# Patient Record
Sex: FEMALE | Race: WHITE | Employment: OTHER | URBAN - METROPOLITAN AREA
[De-identification: names, ages, dates, MRNs, and addresses within clinical notes are randomized per-mention and may not be internally consistent; named-entity substitution may affect disease eponyms.]

---

## 2020-01-08 ENCOUNTER — HOSPITAL ENCOUNTER (OUTPATIENT)
Dept: PHYSICAL THERAPY | Age: 70
Discharge: HOME | End: 2020-01-08
Payer: MEDICARE

## 2020-01-08 PROCEDURE — 97110 THERAPEUTIC EXERCISES: CPT | Performed by: PHYSICAL THERAPIST

## 2020-01-08 PROCEDURE — 97162 PT EVAL MOD COMPLEX 30 MIN: CPT | Performed by: PHYSICAL THERAPIST

## 2020-01-08 NOTE — PROGRESS NOTES
PT DAILY TREATMENT NOTE/LUMBAR EVAL 10-18    Patient Name: Maegan Cash  Date:2020  : 1950  [x]  Patient  Verified  Payor: [place] MEDICARE / Plan: [place] MEDICARE PART A & B / Product Type: Medicare /    In time:8:40  Out time:9:30  Total Treatment Time (min): 50  Visit #: 1 of 12    Medicare/BCBS Only   Total Timed Codes (min):  30 1:1 Treatment Time:  50     Treatment Area: Lumbar spine pain [M54.5]  Left hip pain [M25.552]  SUBJECTIVE  Pain Level (0-10 scale): 2/10 now; 2/10 at best; 8/10 at worst.  []constant [x]intermittent [x]improving []worsening []no change since onset    Any medication changes, allergies to medications, adverse drug reactions, diagnosis change, or new procedure performed?: [x] No    [] Yes (see summary sheet for update)  Subjective functional status/changes:     PLOF: Independent. Does garden work. Limitations to PLOF: Avoiding stairs, twisting, turning and lifting. Doing steps one at a time. Mechanism of Injury: Insidious onset lower back pain. Denies any injury. Prior intermittent back pain that was self limited. Current symptoms/Complaints: Pain across her lower back and into the left hip. She also sites pain and \"arthritis\" in her shoulders and knees. Sx increase with twisting, sitting, riding in a car too long. Previous Treatment/Compliance: None  PMHx/Surgical Hx: Left OCTAVIO  Work Hx: Retired, part time work as a CNA. Living Situation: Lives in a two story home. Pt Goals: \"I would like to be more limber and more strength and able to do more\"  Barriers: [x]pain []financial []time []transportation []other  Cognition: A & O x 3       OBJECTIVE/EXAMINATION  Domestic Life: Lives with her brother and sister-in-law. Activity/Recreational Limitations: Limited on stairs. Mobility: Ambulates with increased right lateral lean. FWB. No AD. Self Care: Independent.       20 min [x]Eval                  []Re-Eval       30 min Therapeutic Exercise:  [] See flow sheet : Emphasis on core stabilization and trunk and LE strength, trunk AROM. Rationale: increase ROM and increase strength to improve the patients ability to increase her functional activity level. With   [] TE   [] TA   [] neuro   [] other: Patient Education: [x] Review HEP    [] Progressed/Changed HEP based on:   [] positioning   [] body mechanics   [] transfers   [] heat/ice application    [] other:          Physical Therapy Evaluation - Lumbar Spine ([facility])    Symptoms:  Aggravated by:   [x] Bending [] Sitting [x] Standing [x] Walking   [] Moving [x] Cough [x] Sneeze [] Valsalva   [] AM  [] PM  Lying:  [] sup   [x] pro   [] sidelying   [] Other:     Eased by:    [] Bending [] Sitting [] Standing [] Walking   [] Moving [] AM  [] PM  Lying: [] sup  [] pro  [x] sidelying       Past History/Treatments:     Diagnostic Tests: [] Lab work [x] X-rays    [] CT [] MRI     [] Other:  Results:    OBJECTIVE  Posture:  Lateral Shift: [x] R    [] L     [] +  [] -  Kyphosis: [] Increased [] Decreased   []  WNL  Lordosis:  [] Increased [] Decreased   [] WNL  Pelvic symmetry: [] WNL    [x] Other:  Left IC is higher in standing. Gait:  [] Normal     [x] Abnormal:    Active Movements: [] N/A   [] Too acute   [] Other:  ROM % AROM % PROM Comments:pain, area   Forward flexion 40-60 75%     Extension 20-30 0%  Pain   SB right 20-30 25%     SB left 20-30 0%  Pain L/S and left lateral hip   Rotation right 5-10 25%  Pain right lower back   Rotation left 5-10 25%  Pain lower back     Side Glide:  Sustained passive positioning test:    Dural Mobility:  SLR Sitting: [x] R    [x] L    [] +    [x] -  @ (degrees):           Supine: [x] R    [] L    [x] +    [] -  @ (70 degrees):   Slump Test: [x] R    [x] L    [] +    [x] -  @ (degrees):   Prone Knee Bend: [] R    [] L    [] +    [] -     Palpation  [x] Min  [] Mod  [] Severe    Location: Bilateral hips, lateral aspect.   [] Min  [x] Mod  [] Severe    Location: Right ASIS  [x] Min [] Mod  [] Severe    Location: Right QL    Strength   L(0-5) R (0-5) N/T   Hip Flexion (L1,2)   []   Knee Extension (L3,4)   []   Ankle Dorsiflexion (L4)   []   Great Toe Extension (L5)   []   Ankle Plantarflexion (S1)   []   Knee Flexion (S1,2) 4 4 []   Upper Abdominals   [x]   Lower Abdominals   [x]   Paraspinals   [x]   Back Rotators   [x]   Hip Extension 3 3 []   Hip Abduction 3- 2 []     Special Tests  Lumbar:  Iliolumbar ligament test [x] Pos  [] Neg  Right/Left                  S[place]roilliac:  Compression: [] +    [] -     Gapping:  [] +    [x] -     Leg Length: [x] +    [] -   Position: Supine. Crests: in supine right IC is higher    Mobility: Standing flex: (-)    Supine to sit: Left leg goes from long with normal with supine to sit. Hip: Luellen Dessert:  [x] R    [] L    [x] +   Right lateral hip pain    Piriformis: [x] R    [] L    [x] +             Global Muscular Weakness:  Abdominals:  2/5       Pain Level (0-10 scale) post treatment: 5/10    ASSESSMENT/Changes in Function: Patient with signs and symptoms consistent with lower back pain and hip pain. Patient is (+) for pelvic obliquity and leg length discrepancy. She has limited AROM L/S with core and proximal weakness noted bilaterally. She has tenderness to palpation in the right > left L/S. Patient will continue to benefit from skilled PT services to modify and progress therapeutic interventions, address functional mobility deficits, address ROM deficits, address strength deficits, analyze and address soft tissue restrictions, analyze and cue movement patterns, analyze and modify body mechanics/ergonomics and assess and modify postural abnormalities to attain remaining goals. [x]  See Plan of Care  []  See progress note/recertification  []  See Discharge Summary         Progress towards goals / Updated goals:  Short Term Goals: To be accomplished in 1 weeks:  1.   Patient will become proficient in their HEP and will be compliant in performing that program.  Evaluation:   Patient given a written/illustrated HEP.     Long Term Goals: To be accomplished in 4 weeks:  1. Patient's pain level will be 2-3/10 with activity in order to improve patient's ability to perform normal ADLs. Evaluation:  2/10-8/10  2. Patient will demonstrate AROM Lumbar Spine WFL to increase ease of ADLs. Evaluation:  AROM L/S flex limited 25%; ext limited 100%; Right SB limited 75%; Left SB limited 100%; Right and Left Rotation limited 75%. 3. Patient will increase FOTO score to 48 to indicate increased functional mobility. Evaluation:  38  4. Patient will tolerate reciprocal stair climbing in order to improve her mobility at home. Evaluation:  Doing steps one at a time.     PLAN  [x]  Upgrade activities as tolerated     [x]  Continue plan of care  []  Update interventions per flow sheet       []  Discharge due to:_  []  Other:_      Alondra Carnes, PT 1/8/2020  8:46 AM

## 2020-01-08 NOTE — PROGRESS NOTES
[facility]              [address]        [place], [address] [place]   [phone] fax    Plan of Care/ Statement of Necessity for Physical Therapy Services    Patient name: Crescencio Gerard Start of Care: 2020   Referral source: Ifeoma Brown MD : 1950    Medical Diagnosis: Lumbar spine pain [M54.5]  Left hip pain [M25.552]  Payor: VA MEDICARE / Plan: VA MEDICARE PART A & B / Product Type: Medicare /  Onset Date:2019    Treatment Diagnosis: Low back pain; Left hip pain   Prior Hospitalization: see medical history Provider#: [id number]   Medications: Verified on Patient summary List    Comorbidities: Anxiety/Panic disorder, Arthritis, Back Pain, BMI 33.5, Depression, Incontinence, Left OCTAVIO, Sleep dysfunction. Prior Level of Function: Independent. Did gardening. She noted that she has had difficulty getting up and down off the floor. The Plan of Care and following information is based on the information from the initial evaluation. Assessment/ key information: Patient with signs and symptoms consistent with lower back pain and hip pain. Patient is (+) for pelvic obliquity and leg length discrepancy. She has limited AROM L/S with core and proximal weakness noted bilaterally. She has tenderness to palpation in the right > left L/S. Patient will benefit from a program of skilled physical therapy to include therapeutic exercises to address strength deficits, therapeutic activities to improve functional mobility, neuromuscular reeducation to address balance, coordination and proprioception, manual therapy to address ROM and tissue extensibility and modalities as indicated. All questions were answered.     Evaluation Complexity History HIGH Complexity :3+ comorbidities / personal factors will impact the outcome/ POC ; Examination MEDIUM Complexity : 3 Standardized tests and measures addressing body structure, function, activity limitation and / or participation in recreation  ;Presentation MEDIUM Complexity : Evolving with changing characteristics  ; Clinical Decision Making MEDIUM Complexity : FOTO score of 26-74  Overall Complexity Rating: MEDIUM  Problem List: pain affecting function, decrease ROM, decrease strength, impaired gait/ balance, decrease ADL/ functional abilitiies, decrease activity tolerance and decrease flexibility/ joint mobility   Treatment Plan may include any combination of the following: Therapeutic exercise, Therapeutic activities, Neuromuscular re-education, Physical agent/modality and Manual therapy  Patient / Family readiness to learn indicated by: asking questions, trying to perform skills and interest  Persons(s) to be included in education: patient (P)  Barriers to Learning/Limitations: None  Patient Goal (s): \"I would like to be more limber and more strength and able to do more\"  Patient Self Reported Health Status: good  Rehabilitation Potential: good    Short Term Goals: To be accomplished in 1 weeks:  1. Patient will become proficient in their HEP and will be compliant in performing that program.  Evaluation:   Patient given a written/illustrated HEP. Long Term Goals: To be accomplished in 4 weeks:  1. Patient's pain level will be 2-3/10 with activity in order to improve patient's ability to perform normal ADLs. Evaluation:  2/10-8/10  2. Patient will demonstrate AROM Lumbar Spine WFL to increase ease of ADLs. Evaluation:  AROM L/S flex limited 25%; ext limited 100%; Right SB limited 75%; Left SB limited 100%; Right and Left Rotation limited 75%. 3. Patient will increase FOTO score to 48 to indicate increased functional mobility. Evaluation:  38  4. Patient will tolerate reciprocal stair climbing in order to improve her mobility at home. Evaluation:  Doing steps one at a time. Frequency / Duration: Patient to be seen 2-3 times per week for 4 weeks.     Patient/ Caregiver education and instruction: Diagnosis, prognosis, exercises   [x]  Plan of care has been reviewed with PTA      Certification Period: 1/8/2020-2/6/2020  Edith Mccarthy, PT 1/8/2020 8:44 AM  ________________________________________________________________________    I certify that the above Therapy Services are being furnished while the patient is under my care. I agree with the treatment plan and certify that this therapy is necessary.     Physician's Signature:____________Date:_________TIME:________    ** Signature, Date and Time must be completed for valid certification **  Please sign and return to [facility]/ [address]              [address] [place], [address] [place]   [phone] fax

## 2020-01-10 ENCOUNTER — HOSPITAL ENCOUNTER (OUTPATIENT)
Dept: PHYSICAL THERAPY | Age: 70
Discharge: HOME | End: 2020-01-10
Payer: MEDICARE

## 2020-01-10 PROCEDURE — 97110 THERAPEUTIC EXERCISES: CPT

## 2020-01-10 PROCEDURE — 97112 NEUROMUSCULAR REEDUCATION: CPT

## 2020-01-10 NOTE — PROGRESS NOTES
PT DAILY TREATMENT NOTE 10-18    Patient Name: Debo Jarvis  Date:1/10/2020  : 1950  [x]  Patient  Verified  Payor: VA MEDICARE / Plan: VA MEDICARE PART A & B / Product Type: Medicare /    In time:9:32  Out time:10:04  Total Treatment Time (min): 32  Visit #: 2 of 12    Medicare/BCBS Only   Total Timed Codes (min):  32 1:1 Treatment Time:  32       Treatment Area: Lumbar spine pain [M54.5]  Left hip pain [M25.552]    SUBJECTIVE  Pain Level (0-10 scale): 0  Any medication changes, allergies to medications, adverse drug reactions, diagnosis change, or new procedure performed?: [x] No    [] Yes (see summary sheet for update)  Subjective functional status/changes:   [] No changes reported  Pt reports that movement causes an increase in back pain.      OBJECTIVE        Min Type Additional Details    [] Estim:  []Unatt       []IFC  []Premod                        []Other:  []w/ice   []w/heat  Position:  Location:    [] Estim: []Att    []TENS instruct  []NMES                    []Other:  []w/US   []w/ice   []w/heat  Position:  Location:    []  Traction: [] Cervical       []Lumbar                       [] Prone          []Supine                       []Intermittent   []Continuous Lbs:  [] before manual  [] after manual    []  Ultrasound: []Continuous   [] Pulsed                           []1MHz   []3MHz W/cm2:  Location:    []  Iontophoresis with dexamethasone         Location: [] Take home patch   [] In clinic    []  Ice     []  heat  []  Ice massage  []  Laser   []  Anodyne Position:  Location:    []  Laser with stim  []  Other:  Position:  Location:    []  Vasopneumatic Device Pressure:       [] lo [] med [] hi   Temperature: [] lo [] med [] hi   [] Skin assessment post-treatment:  []intact []redness- no adverse reaction    []redness  adverse reaction:         17 min Therapeutic Exercise:  [x] See flow sheet :   Rationale: increase ROM and increase strength to improve the patients ability to increase standing tolerance. 15 min Neuromuscular Re-education:  [x]  See flow sheet :core activation exercises. Rationale: increase ROM and increase strength  to improve the patients ability to increase tolerance to activities. With   [] TE   [] TA   [] neuro   [] other: Patient Education: [x] Review HEP    [] Progressed/Changed HEP based on:   [] positioning   [] body mechanics   [] transfers   [] heat/ice application    [] other:      Other Objective/Functional Measures: Pt reports performing HEP. Pain Level (0-10 scale) post treatment: 2    ASSESSMENT/Changes in Function: Initiated exercises per POC. Pt has limited motion and mobility with supine lumbar trunk rotation. Patient will continue to benefit from skilled PT services to modify and progress therapeutic interventions, address functional mobility deficits, address ROM deficits, address strength deficits and analyze and address soft tissue restrictions to attain remaining goals. []  See Plan of Care  []  See progress note/recertification  []  See Discharge Summary         Progress towards goals / Updated goals:  Short Term Goals: To be accomplished in 1 weeks:  1.  Patient will become proficient in their HEP and will be compliant in performing that program.  Evaluation:   Patient given a written/illustrated HEP. Current: Goal met: Pt reports performing HEP. 1/10/20     Long Term Goals: To be accomplished in 4 weeks:  1. Patient's pain level will be 2-3/10 with activity in order to improve patient's ability to perform normal ADLs. Evaluation:  2/10-8/10  2. Patient will demonstrate AROM Lumbar Spine WFL to increase ease of ADLs. Evaluation:  AROM L/S flex limited 25%; ext limited 100%; Right SB limited 75%; Left SB limited 100%; Right and Left Rotation limited 75%. 3. Patient will increase FOTO score to 48 to indicate increased functional mobility. Evaluation:  38  4.  Patient will tolerate reciprocal stair climbing in order to improve her mobility at home.   Evaluation:  Doing steps one at a time.       PLAN  []  Upgrade activities as tolerated     [x]  Continue plan of care  []  Update interventions per flow sheet       []  Discharge due to:_  []  Other:_      Trudy Kayser, TAMELA 1/10/2020  9:35 AM    Future Appointments   Date Time Provider Troy Shantal   1/14/2020 10:30 AM Kenneth Sanford, PT [facility]   1/17/2020 10:30 AM Betancourt Simpers, PTA [facility]   1/21/2020  8:30 AM Kenneth Sanford, PT [facility]   1/24/2020 10:00 AM Betancourt Simpers, PTA [facility]   1/28/2020  8:00 AM Kenneth Sanford, PT [facility]   1/31/2020 10:30 AM Betancourt Simpers, PTA [facility]

## 2020-01-14 ENCOUNTER — HOSPITAL ENCOUNTER (OUTPATIENT)
Dept: PHYSICAL THERAPY | Age: 70
Discharge: HOME | End: 2020-01-14
Payer: MEDICARE

## 2020-01-14 PROCEDURE — 97110 THERAPEUTIC EXERCISES: CPT | Performed by: PHYSICAL THERAPIST

## 2020-01-14 PROCEDURE — 97112 NEUROMUSCULAR REEDUCATION: CPT | Performed by: PHYSICAL THERAPIST

## 2020-01-14 NOTE — PROGRESS NOTES
PT DAILY TREATMENT NOTE 10-18    Patient Name: Maryfrances Oppenheim  Date:2020  : 1950  [x]  Patient  Verified  Payor: VA MEDICARE / Plan: [place] MEDICARE PART A & B / Product Type: Medicare /    In time:10:30  Out time:11:05  Total Treatment Time (min): 35  Visit #: 3 of 12    Medicare/BCBS Only   Total Timed Codes (min):  35 1:1 Treatment Time:  35       Treatment Area: Lumbar spine pain [M54.5]  Left hip pain [M25.552]    SUBJECTIVE  Pain Level (0-10 scale): 0/10  Any medication changes, allergies to medications, adverse drug reactions, diagnosis change, or new procedure performed?: [x] No    [] Yes (see summary sheet for update)  Subjective functional status/changes:   [] No changes reported  Patient reports she is having less pain and is feeling better with onset of skilled therapy. OBJECTIVE    20 min Therapeutic Exercise:  [] See flow sheet : Emphasis on trunk AROM and LE strengthening   Rationale: increase ROM and increase strength to improve the patients ability to increase standing tolerance. 15 min Neuromuscular Re-education:  []  See flow sheet : Emphasis on core strengthening. Rationale: increase strength and improve coordination  to improve the patients ability to increase patients ADLs. With   [] TE   [] TA   [] neuro   [] other: Patient Education: [x] Review HEP    [] Progressed/Changed HEP based on:   [] positioning   [] body mechanics   [] transfers   [] heat/ice application    [] other:      Other Objective/Functional Measures: Patient with decreased ability to SB left. Pain Level (0-10 scale) post treatment: 4    ASSESSMENT/Changes in Function: Patient with continued right sided lower back pain and hip pain.     Patient will continue to benefit from skilled PT services to modify and progress therapeutic interventions, address functional mobility deficits, address ROM deficits, address strength deficits and analyze and address soft tissue restrictions to attain remaining goals. [x]? See Plan of Care  []? See progress note/recertification  []? See Discharge Summary         Progress towards goals / Updated goals:  Short Term Goals: To be accomplished in 1 weeks:  1.  Patient will become proficient in their HEP and will be compliant in performing that program.  Evaluation:   Patient given a written/illustrated HEP. Current: Goal met: Pt reports performing HEP. 1/10/20     Long Term Goals: To be accomplished in 4 weeks:  1. Patient's pain level will be 2-3/10 with activity in order to improve patient's ability to perform normal ADLs. Evaluation:  2/10-8/10  Current:  0/10-5/10.  1/14/2020. Progressing. 2. Patient will demonstrate AROM Lumbar Spine WFL to increase ease of ADLs. Evaluation:  AROM L/S flex limited 25%; ext limited 100%; Right SB limited 75%; Left SB limited 100%; Right and Left Rotation limited 75%. 3. Patient will increase FOTO score to 48 to indicate increased functional mobility. Evaluation:  38  4. Patient will tolerate reciprocal stair climbing in order to improve her mobility at home. Evaluation:  Doing steps one at a time.     PLAN  [x]  Upgrade activities as tolerated     [x]  Continue plan of care  []  Update interventions per flow sheet       []  Discharge due to:_  []  Other:_      Charmel Barthel, PT 1/14/2020  10:33 AM    Future Appointments   Date Time Provider Troy Murguia   1/17/2020 10:30 AM Jorge Lr PTA [facility]   1/21/2020  8:30 AM Cherelle Cleveland PT [facility]   1/24/2020 10:00 AM Jorge Lr PTA [facility]   1/28/2020  8:00 AM Cherelle Cleveland PT [facility]   1/31/2020 10:30 AM Jorge Lr PTA [facility]

## 2020-01-17 ENCOUNTER — HOSPITAL ENCOUNTER (OUTPATIENT)
Dept: PHYSICAL THERAPY | Age: 70
Discharge: HOME | End: 2020-01-17
Payer: MEDICARE

## 2020-01-17 PROCEDURE — 97110 THERAPEUTIC EXERCISES: CPT

## 2020-01-17 PROCEDURE — 97112 NEUROMUSCULAR REEDUCATION: CPT

## 2020-01-17 NOTE — PROGRESS NOTES
PT DAILY TREATMENT NOTE 10-18    Patient Name: Aldair Andrade  Date:2020  : 1950  [x]  Patient  Verified  Payor: [place] MEDICARE / Plan: [place] MEDICARE PART A & B / Product Type: Medicare /    In time:10:34  Out time:11:34  Total Treatment Time (min): 60  Visit #: 4 of 12    Medicare/BCBS Only   Total Timed Codes (min):  50 1:1 Treatment Time:  40       Treatment Area: Lumbar spine pain [M54.5]  Left hip pain [M25.552]    SUBJECTIVE  Pain Level (0-10 scale): 3  Any medication changes, allergies to medications, adverse drug reactions, diagnosis change, or new procedure performed?: [x] No    [] Yes (see summary sheet for update)  Subjective functional status/changes:   [] No changes reported  Pt reports she was sore and had a little pain after last session. OBJECTIVE    Modality rationale: decrease pain to improve the patients ability to decrease difficulty while performing tasks.     Min Type Additional Details    [] Estim:  []Unatt       []IFC  []Premod                        []Other:  []w/ice   []w/heat  Position:  Location:    [] Estim: []Att    []TENS instruct  []NMES                    []Other:  []w/US   []w/ice   []w/heat  Position:  Location:    []  Traction: [] Cervical       []Lumbar                       [] Prone          []Supine                       []Intermittent   []Continuous Lbs:  [] before manual  [] after manual    []  Ultrasound: []Continuous   [] Pulsed                           []1MHz   []3MHz W/cm2:  Location:    []  Iontophoresis with dexamethasone         Location: [] Take home patch   [] In clinic   10 []  Ice     [x]  heat  []  Ice massage  []  Laser   []  Anodyne Position:sitting  Location:back     []  Laser with stim  []  Other:  Position:  Location:    []  Vasopneumatic Device Pressure:       [] lo [] med [] hi   Temperature: [] lo [] med [] hi   [] Skin assessment post-treatment:  []intact []redness- no adverse reaction    []redness  adverse reaction:           35 min Therapeutic Exercise:  [x]? See flow sheet :   Rationale: increase ROM and increase strength to improve the patients ability to increase standing tolerance.      15 min Neuromuscular Re-education:  [x]? See flow sheet :core activation exercises. Rationale: increase ROM and increase strength  to improve the patients ability to increase tolerance to activities. With   [] TE   [] TA   [] neuro   [] other: Patient Education: [x] Review HEP    [] Progressed/Changed HEP based on:   [] positioning   [] body mechanics   [] transfers   [] heat/ice application    [] other:      Other Objective/Functional Measures: fair form to perform PPT. Pain Level (0-10 scale) post treatment:2    ASSESSMENT/Changes in Function: Continue to progress pt as tolerated. NO initiations of new exercises due to being sore after last visit. Patient will continue to benefit from skilled PT services to modify and progress therapeutic interventions, address functional mobility deficits, address ROM deficits, address strength deficits, analyze and address soft tissue restrictions and address imbalance/dizziness to attain remaining goals. []  See Plan of Care  []  See progress note/recertification  []  See Discharge Summary         Progress towards goals / Updated goals:  hort Term Goals: To be accomplished in 1 weeks:  1.  Patient will become proficient in their HEP and will be compliant in performing that program.  Evaluation:   Patient given a written/illustrated HEP. Current: Goal met: Pt reports performing HEP. 1/10/20     Long Term Goals: To be accomplished in 4 weeks:  1. Patient's pain level will be 2-3/10 with activity in order to improve patient's ability to perform normal ADLs. Evaluation:  2/10-8/10  Current:  0/10-5/10.  1/14/2020. Progressing. 2. Patient will demonstrate AROM Lumbar Spine WFL to increase ease of ADLs. Evaluation:  AROM L/S flex limited 25%; ext limited 100%; Right SB limited 75%;  Left SB limited 100%; Right and Left Rotation limited 75%. 3. Patient will increase FOTO score to 48 to indicate increased functional mobility. Evaluation:  38  4. Patient will tolerate reciprocal stair climbing in order to improve her mobility at home. Evaluation:  Doing steps one at a time.     PLAN  []  Upgrade activities as tolerated     [x]  Continue plan of care  []  Update interventions per flow sheet       []  Discharge due to:_  []  Other:_      Jennyfer Fuller PTA 1/17/2020  10:40 AM    Future Appointments   Date Time Provider Troy Murguia   1/21/2020  8:30 AM Andrés Hernandez, PT [facility] [address]   1/24/2020 10:00 AM Patrice Elias PTA [facility] [address]   1/28/2020  8:00 AM Andrés Hernandez PT [facility] [address]   1/31/2020 10:30 AM Patrice Elias PTA [facility] [address]

## 2020-01-21 ENCOUNTER — HOSPITAL ENCOUNTER (OUTPATIENT)
Dept: PHYSICAL THERAPY | Age: 70
Discharge: HOME | End: 2020-01-21
Payer: MEDICARE

## 2020-01-21 PROCEDURE — 97112 NEUROMUSCULAR REEDUCATION: CPT | Performed by: PHYSICAL THERAPIST

## 2020-01-21 PROCEDURE — 97110 THERAPEUTIC EXERCISES: CPT | Performed by: PHYSICAL THERAPIST

## 2020-01-21 NOTE — PROGRESS NOTES
PT DAILY TREATMENT NOTE 10-18    Patient Name: Debo Jarvis  Date:2020  : 1950  [x]  Patient  Verified  Payor: [place] MEDICARE / Plan: [place] MEDICARE PART A & B / Product Type: Medicare /    In time:8:28  Out time:9:20  Total Treatment Time (min): 52  Visit #: 5 of 12    Medicare/BCBS Only   Total Timed Codes (min):  52 1:1 Treatment Time:  32       Treatment Area: Lumbar spine pain [M54.5]  Left hip pain [M25.552]    SUBJECTIVE  Pain Level (0-10 scale): 0  Any medication changes, allergies to medications, adverse drug reactions, diagnosis change, or new procedure performed?: [x] No    [] Yes (see summary sheet for update)  Subjective functional status/changes:   [] No changes reported  Patient denies pain in her hip and back but notes some right shoulder pain. OBJECTIVE    35 min Therapeutic Exercise:  [] See flow sheet : Emphasis is on trunk and hip ROM and strength. Rationale: increase ROM and increase strength to improve the patients ability to increase patients ADLs. 17 min Neuromuscular Re-education:  []  See flow sheet : Emphasis on core activation for lumbar stabilization. Rationale: increase strength and improve coordination  to improve the patients ability to increase tolerance to activities. With   [] TE   [] TA   [] neuro   [] other: Patient Education: [x] Review HEP    [] Progressed/Changed HEP based on:   [] positioning   [] body mechanics   [] transfers   [] heat/ice application    [] other:      Other Objective/Functional Measures: Patient performed stair climbing in therapy today with 1 HHA to replicate her home environment. She was able to perform reciprocal stair climbing 5 steps x 5. Pain Level (0-10 scale) post treatment: 0    ASSESSMENT/Changes in Function: Patient with decreased pain, she still has intermittent episodes of back and hip pain. She is able to perform steps reciprocally in therapy.     Patient will continue to benefit from skilled PT services to modify and progress therapeutic interventions, address functional mobility deficits, address ROM deficits, address strength deficits, analyze and address soft tissue restrictions and address imbalance/dizziness to attain remaining goals. [x]  See Plan of Care  []  See progress note/recertification  []  See Discharge Summary         Progress towards goals / Updated goals:  Short Term Goals: To be accomplished in 1 weeks:  1.  Patient will become proficient in their HEP and will be compliant in performing that program.  Evaluation:   Patient given a written/illustrated HEP. Current: Goal met: Pt reports performing HEP. 1/10/20     Long Term Goals: To be accomplished in 4 weeks:  1. Patient's pain level will be 2-3/10 with activity in order to improve patient's ability to perform normal ADLs. Evaluation:  2/10-8/10  Current:  0/10-5/10.  1/21/2020.  Progressing. 2. Patient will demonstrate AROM Lumbar Spine WFL to increase ease of ADLs. Evaluation:  AROM L/S flex limited 25%; ext limited 100%; Right SB limited 75%; Left SB limited 100%; Right and Left Rotation limited 75%. 3. Patient will increase FOTO score to 48 to indicate increased functional mobility. Evaluation:  38  4. Patient will tolerate reciprocal stair climbing in order to improve her mobility at home. Evaluation:  Doing steps one at a time. Current:  Still doing steps one at a time. 1/21/20. No change.     PLAN  [x]  Upgrade activities as tolerated     [x]  Continue plan of care  []  Update interventions per flow sheet       []  Discharge due to:_  []  Other:_      Marcelo Talavera, PT 1/21/2020  8:32 AM    Future Appointments   Date Time Provider Troy Murguia   1/24/2020 10:00 AM Meryle Furry, PTA [facility]   1/28/2020  8:00 AM David Coleman PT [facility]   1/31/2020 10:30 AM Meryle Furry, PTA [facility]

## 2020-01-24 ENCOUNTER — HOSPITAL ENCOUNTER (OUTPATIENT)
Dept: PHYSICAL THERAPY | Age: 70
Discharge: HOME | End: 2020-01-24
Payer: MEDICARE

## 2020-01-24 PROCEDURE — 97112 NEUROMUSCULAR REEDUCATION: CPT

## 2020-01-24 PROCEDURE — 97110 THERAPEUTIC EXERCISES: CPT

## 2020-01-24 NOTE — PROGRESS NOTES
PT DAILY TREATMENT NOTE 10-18    Patient Name: Klever Cervantes  Date:2020  : 1950  [x]  Patient  Verified  Payor: VA MEDICARE / Plan: VA MEDICARE PART A & B / Product Type: Medicare /    In time:10:03  Out time:11:05  Total Treatment Time (min): 62  Visit #: 6 of 12    Medicare/BCBS Only   Total Timed Codes (min):  52 1:1 Treatment Time:  40       Treatment Area: Lumbar spine pain [M54.5]  Left hip pain [M25.552]    SUBJECTIVE  Pain Level (0-10 scale): 0  Any medication changes, allergies to medications, adverse drug reactions, diagnosis change, or new procedure performed?: [x] No    [] Yes (see summary sheet for update)  Subjective functional status/changes:   [] No changes reported  Pt reports that she most has pain that comes on at the end of the day. OBJECTIVE    Modality rationale: decrease pain to improve the patients ability to decrease difficulty while performing tasks.     Min Type Additional Details    [] Estim:  []Unatt       []IFC  []Premod                        []Other:  []w/ice   []w/heat  Position:  Location:    [] Estim: []Att    []TENS instruct  []NMES                    []Other:  []w/US   []w/ice   []w/heat  Position:  Location:    []  Traction: [] Cervical       []Lumbar                       [] Prone          []Supine                       []Intermittent   []Continuous Lbs:  [] before manual  [] after manual    []  Ultrasound: []Continuous   [] Pulsed                           []1MHz   []3MHz W/cm2:  Location:    []  Iontophoresis with dexamethasone         Location: [] Take home patch   [] In clinic   10 []  Ice     [x]  heat  []  Ice massage  []  Laser   []  Anodyne Position:sitting  Location:back     []  Laser with stim  []  Other:  Position:  Location:    []  Vasopneumatic Device Pressure:       [] lo [] med [] hi   Temperature: [] lo [] med [] hi   [] Skin assessment post-treatment:  []intact []redness- no adverse reaction    []redness  adverse reaction:       27 min Therapeutic Exercise:  [x]? ? See flow sheet :   Rationale: increase ROM and increase strength to improve the patients ability to increase standing tolerance.      25 min Neuromuscular Re-education:  [x]? ?  See flow sheet :core activation exercises.    Rationale: increase ROM and increase strength  to improve the patients ability to increase tolerance to activities.               With   [] TE   [] TA   [] neuro   [] other: Patient Education: [x] Review HEP    [] Progressed/Changed HEP based on:   [] positioning   [] body mechanics   [] transfers   [] heat/ice application    [] other:      Other Objective/Functional Measures: Pt has poor activation with performing exercises. Pain Level (0-10 scale) post treatment: 0    ASSESSMENT/Changes in Function: Continue to work on increase in standing tolerance and tolerance to activities. Patient will continue to benefit from skilled PT services to modify and progress therapeutic interventions, address functional mobility deficits, address ROM deficits, address strength deficits and analyze and address soft tissue restrictions to attain remaining goals. []  See Plan of Care  []  See progress note/recertification  []  See Discharge Summary         Progress towards goals / Updated goals:  Short Term Goals: To be accomplished in 1 weeks:  1.  Patient will become proficient in their HEP and will be compliant in performing that program.  Evaluation:   Patient given a written/illustrated HEP. Current: Goal met: Pt reports performing HEP. 1/10/20     Long Term Goals: To be accomplished in 4 weeks:  1. Patient's pain level will be 2-3/10 with activity in order to improve patient's ability to perform normal ADLs. Evaluation:  2/10-8/10  Current:  0/10-5/10.  1/21/2020.  Progressing. 2. Patient will demonstrate AROM Lumbar Spine WFL to increase ease of ADLs. Evaluation:  AROM L/S flex limited 25%; ext limited 100%; Right SB limited 75%;  Left SB limited 100%; Right and Left Rotation limited 75%. 3. Patient will increase FOTO score to 48 to indicate increased functional mobility. Evaluation:  38  4. Patient will tolerate reciprocal stair climbing in order to improve her mobility at home. Evaluation:  Doing steps one at a time. Current:  Still doing steps one at a time. 1/21/20.   No change.       PLAN  []  Upgrade activities as tolerated     [x]  Continue plan of care  []  Update interventions per flow sheet       []  Discharge due to:_  []  Other:_      Shanda Brittle, PTA 1/24/2020  10:08 AM    Future Appointments   Date Time Provider Troy Murguia   1/28/2020  8:00 AM Elvis Chinchilla, PT [facility]   1/31/2020 10:30 AM Janis Zamora PTA [facility]

## 2020-01-28 ENCOUNTER — HOSPITAL ENCOUNTER (OUTPATIENT)
Dept: PHYSICAL THERAPY | Age: 70
Discharge: HOME | End: 2020-01-28
Payer: MEDICARE

## 2020-01-28 PROCEDURE — 97110 THERAPEUTIC EXERCISES: CPT | Performed by: PHYSICAL THERAPIST

## 2020-01-28 PROCEDURE — 97112 NEUROMUSCULAR REEDUCATION: CPT | Performed by: PHYSICAL THERAPIST

## 2020-01-28 NOTE — PROGRESS NOTES
PT DAILY TREATMENT NOTE 10-18    Patient Name: Destinee Vang  Date:2020  : 1950  [x]  Patient  Verified  Payor: VA MEDICARE / Plan: VA MEDICARE PART A & B / Product Type: Medicare /    In time:7:58  Out time:8:57  Total Treatment Time (min): 61  Visit #: 7 of 12    Medicare/BCBS Only   Total Timed Codes (min):  59 1:1 Treatment Time:  55       Treatment Area: Lumbar spine pain [M54.5]  Left hip pain [M25.552]    SUBJECTIVE  Pain Level (0-10 scale): 1  Any medication changes, allergies to medications, adverse drug reactions, diagnosis change, or new procedure performed?: [x] No    [] Yes (see summary sheet for update)  Subjective functional status/changes:   [] No changes reported  Patient reports more pain in her left hip than her right hip or back at this time. Also notes some right knee pain. OBJECTIVE    29 min Therapeutic Exercise:  [] See flow sheet : Emphasis on trunk AROM and LE strength. Rationale: increase ROM and increase strength to improve the patients ability to increase her standing tolerance. 25 min Neuromuscular Re-education:  []  See flow sheet :Emphasis on core activation to improve lumbar strength and stability. Rationale: increase strength and improve coordination  to improve the patients ability to increase patients ADLs. With   [] TE   [] TA   [] neuro   [] other: Patient Education: [x] Review HEP    [] Progressed/Changed HEP based on:   [] positioning   [] body mechanics   [] transfers   [] heat/ice application    [] other:      Other Objective/Functional Measures: AROM L/S flex limited 25%; ext limited 75%; Right SB and Left SB equal bilaterally;  Right and Left Rotation limited 75%. Pain with extension, Right SB. Pain Level (0-10 scale) post treatment: 1    ASSESSMENT/Changes in Function:  Patient with decreased pain. She has improved function as noted on her FOTO and increased AROM.     Patient will continue to benefit from skilled PT services to modify and progress therapeutic interventions, address functional mobility deficits, address ROM deficits, address strength deficits and analyze and address soft tissue restrictions to attain remaining goals. []? See Plan of Care  [x]? See progress note/recertification  []? See Discharge Summary         Progress towards goals / Updated goals:  Short Term Goals: To be accomplished in 1 weeks:  1.  Patient will become proficient in their HEP and will be compliant in performing that program.  Evaluation:   Patient given a written/illustrated HEP. Current: Goal met: Pt reports performing HEP. 1/10/20     Long Term Goals: To be accomplished in 4 weeks:  1. Patient's pain level will be 2-3/10 with activity in order to improve patient's ability to perform normal ADLs. Evaluation:  2/10-8/10  Current:  0/10-3/10.  1/28/2020. Goal Met.  2. Patient will demonstrate AROM Lumbar Spine WFL to increase ease of ADLs. Evaluation:  AROM L/S flex limited 25%; ext limited 100%; Right SB limited 75%; Left SB limited 100%; Right and Left Rotation limited 75%. Current:  AROM L/S flex limited 25%; ext limited 75%; Right SB and Left SB equal bilaterally;  Right and Left Rotation limited 75%. Pain with extension, Right SB.  1/28/2020.  3. Patient will increase FOTO score to 48 to indicate increased functional mobility. Evaluation:  38   Current:  53.  1/28/2020.    4. Patient will tolerate reciprocal stair climbing in order to improve her mobility at home. Evaluation:  Doing steps one at a time. Current:  States she does reciprocal stairs intermittently depending on fatigue and if she is carrying anything. 1/28/2020. Progressing.     PLAN  [x]  Upgrade activities as tolerated     [x]  Continue plan of care  []  Update interventions per flow sheet       []  Discharge due to:_  []  Other:_      Quinn Lewis, PT 1/28/2020  8:01 AM    Future Appointments   Date Time Provider Troy Murguia   1/31/2020 10:30 AM Marc Bird, [place] [facility]

## 2020-01-28 NOTE — PROGRESS NOTES
[facility]              [address], [address]   [phone] fax    Continued Plan of Care/ Re-certification for Physical Therapy Services    Patient name: Maryfrances Oppenheim Start of Care: 2019   Referral source: Jennifer Arora MD : 1950   Medical/Treatment Diagnosis: Lumbar spine pain [M54.5]  Left hip pain [M25.552]  Payor: VA MEDICARE / Plan: VA MEDICARE PART A & B / Product Type: Medicare /  Onset Date:10/2019     Prior Hospitalization: see medical history Provider#: [id number]   Medications: Verified on Patient Summary List    Comorbidities: Anxiety/Panic disorder, Arthritis, Back Pain, BMI 33.5, Depression, Incontinence, Left OCTAVIO, Sleep dysfunction. Prior Level of Function: Independent. Did gardening. She noted that she has had difficulty getting up and down off the floor. Visits from Start of Care: 7    Missed Visits: 0    The Plan of Care and following information is based on the patient's current status:  Goal: Patient's pain level will be 2-3/10 with activity in order to improve patient's ability to perform normal ADLs. Status at last note/certification: -  Current Status: 0/10-3/10. met    Goal: Patient will demonstrate AROM Lumbar Spine WFL to increase ease of ADLs. Status at last note/certification: AROM L/S flex limited 25%; ext limited 100%; Right SB limited 75%; Left SB limited 100%; Right and Left Rotation limited 75%. Current Status: AROM L/S flex limited 25%; ext limited 75%; Right SB and Left SB equal bilaterally;  Right and Left Rotation limited 75%. Pain with extension, Right SB. Progressing, not met    Goal: Patient will increase FOTO score to 48 to indicate increased functional mobility. Status at last note/certification:  70   Current Status: 53. met    Goal: Patient will tolerate reciprocal stair climbing in order to improve her mobility at home.   Status at last note/certification: Doing steps one at a time. Current Status:States she does reciprocal stairs intermittently depending on fatigue and if she is carrying anything. Partially met    Key functional changes: Patient has improved her ability to go up and down steps and can do 5 steps reciprocally up and down in therapy x 5 repetitions. She has decreased c/o pain and increased AROM. Problems/ barriers to goal attainment: None     Problem List: decrease ROM, decrease strength, impaired gait/ balance and decrease ADL/ functional abilitiies    Treatment Plan: Therapeutic exercise, Therapeutic activities, Neuromuscular re-education, Physical agent/modality, Gait/balance training and Manual therapy     Patient Goal (s) has been updated and includes:   Short Term Goals: To be accomplished in 1 weeks:  1.  Patient will become proficient in their HEP and will be compliant in performing that program.  Evaluation:   Patient given a written/illustrated HEP. Current: Goal met: Pt reports performing HEP. 1/10/20     Long Term Goals: To be accomplished in 4 weeks:  1. Patient's pain level will be 2-3/10 with activity in order to improve patient's ability to perform normal ADLs. Evaluation:  2/10-8/10  Current:  0/10-3/10.  1/28/2020. Goal Met.  2. Patient will demonstrate AROM Lumbar Spine WFL to increase ease of ADLs. Evaluation:  AROM L/S flex limited 25%; ext limited 100%; Right SB limited 75%; Left SB limited 100%; Right and Left Rotation limited 75%. Current:  AROM L/S flex limited 25%; ext limited 75%; Right SB and Left SB equal bilaterally;  Right and Left Rotation limited 75%. Pain with extension, Right SB.  1/28/2020.  3. Patient will increase FOTO score to 48 to indicate increased functional mobility. Evaluation:  38   Current:  53.  1/28/2020.    4. Patient will tolerate reciprocal stair climbing in order to improve her mobility at home. Evaluation:  Doing steps one at a time.   Current:  States she does reciprocal stairs intermittently depending on fatigue and if she is carrying anything. 1/28/2020. Progressing. Goals for this certification period to be accomplished in 4 weeks:   1. Patient will demonstrate AROM Lumbar Spine WFL to increase ease of ADLs. PN:  AROM L/S flex limited 25%; ext limited 75%; Right SB and Left SB equal bilaterally;  Right and Left Rotation limited 75%. Pain with extension, Right SB.  1/28/2020.  2. Patient will tolerate reciprocal stair climbing in order to improve her mobility at home. PN:  States she does reciprocal stairs intermittently depending on fatigue and if she is carrying anything. 1/28/2020. Progressing. 3.  Patient will improve her dynamic balance to a rating of good in order to safely ambulate in her community. PN:  Fair dynamic balance. Frequency / Duration: Patient to be seen 2 times per week for 4 weeks:    Assessment / Recommendations:Patient has made improvements in ROM and decreased pain. She does have a concern regarding her balance and would recommend we continue with skilled therapy to address balance to reduce any fall risk. Certification Period: 2/3/2020 - 3/2/2020    Marii Merritt, PT 1/28/2020 12:08 PM    ________________________________________________________________________  I certify that the above Therapy Services are being furnished while the patient is under my care. I agree with the treatment plan and certify that this therapy is necessary. [] I have read the above and request that my patient continue as recommended.   [] I have read the above report and request that my patient continue therapy with the following changes/special instructions: _______________________________________  [] I have read the above report and request that my patient be discharged from therapy    Physician's Signature:____________Date:_________TIME:________    ** Signature, Date and Time must be completed for valid certification **  Please sign and return to [facility]              [address], [address]  [phone] fax

## 2020-01-31 ENCOUNTER — HOSPITAL ENCOUNTER (OUTPATIENT)
Dept: PHYSICAL THERAPY | Age: 70
Discharge: HOME | End: 2020-01-31
Payer: MEDICARE

## 2020-01-31 PROCEDURE — 97112 NEUROMUSCULAR REEDUCATION: CPT

## 2020-01-31 PROCEDURE — 97110 THERAPEUTIC EXERCISES: CPT

## 2020-01-31 NOTE — PROGRESS NOTES
PT DAILY TREATMENT NOTE 10-18    Patient Name: Debora Loyola  Date:2020  : 1950  [x]  Patient  Verified  Payor: [place] MEDICARE / Plan: VA MEDICARE PART A & B / Product Type: Medicare /    In time:10:32  Out time:11:22  Total Treatment Time (min): 50  Visit #: 1 of 8 (new recert)    Medicare/BCBS Only   Total Timed Codes (min):  50 1:1 Treatment Time:  40       Treatment Area: Lumbar spine pain [M54.5]  Left hip pain [M25.552]    SUBJECTIVE  Pain Level (0-10 scale): 0  Any medication changes, allergies to medications, adverse drug reactions, diagnosis change, or new procedure performed?: [x] No    [] Yes (see summary sheet for update)  Subjective functional status/changes:   [] No changes reported  Pt reports that her back and right hip have been feeling better, but now her left hip has been giving her problems. OBJECTIVE        Min Type Additional Details    [] Estim:  []Unatt       []IFC  []Premod                        []Other:  []w/ice   []w/heat  Position:  Location:    [] Estim: []Att    []TENS instruct  []NMES                    []Other:  []w/US   []w/ice   []w/heat  Position:  Location:    []  Traction: [] Cervical       []Lumbar                       [] Prone          []Supine                       []Intermittent   []Continuous Lbs:  [] before manual  [] after manual    []  Ultrasound: []Continuous   [] Pulsed                           []1MHz   []3MHz W/cm2:  Location:    []  Iontophoresis with dexamethasone         Location: [] Take home patch   [] In clinic    []  Ice     []  heat  []  Ice massage  []  Laser   []  Anodyne Position:  Location:    []  Laser with stim  []  Other:  Position:  Location:    []  Vasopneumatic Device Pressure:       [] lo [] med [] hi   Temperature: [] lo [] med [] hi   [] Skin assessment post-treatment:  []intact []redness- no adverse reaction    []redness  adverse reaction:           25 min Therapeutic Exercise:  [x]? ?? See flow sheet : Rationale: increase ROM and increase strength to improve the patients ability to increase standing tolerance.      25 min Neuromuscular Re-education:  [x]? ??  See flow sheet :core activation exercises.    Rationale: increase ROM and increase strength  to improve the patients ability to increase tolerance to activities.            With   [] TE   [] TA   [] neuro   [] other: Patient Education: [x] Review HEP    [] Progressed/Changed HEP based on:   [] positioning   [] body mechanics   [] transfers   [] heat/ice application    [] other:      Other Objective/Functional Measures: continues to be limited with SLS. Pain Level (0-10 scale) post treatment: 3    ASSESSMENT/Changes in Function: Pt wants to be put on hold until she follows up with her doctor on 2/4/20 to see if it would be beneficial to continue therapy. Patient will continue to benefit from skilled PT services to modify and progress therapeutic interventions, address functional mobility deficits, address ROM deficits, address strength deficits and analyze and address soft tissue restrictions to attain remaining goals. []  See Plan of Care  []  See progress note/recertification  []  See Discharge Summary         Progress towards goals / Updated goals:  Short Term Goals: To be accomplished in 1 weeks:  1.  Patient will become proficient in their HEP and will be compliant in performing that program.  PN: : Goal met: Pt reports performing HEP. 1/10/20     Long Term Goals: To be accomplished in 4 weeks:  1. Patient's pain level will be 2-3/10 with activity in order to improve patient's ability to perform normal ADLs. PN: Goal Met. 0/10-3/10.  1/28/2020.   2. Patient will demonstrate AROM Lumbar Spine WFL to increase ease of ADLs. PN:  AROM L/S flex limited 25%; ext limited 75%; Right SB and Left SB equal bilaterally;  Right and Left Rotation limited 75%.   Pain with extension, Right SB.  1/28/2020.  3. Patient will increase FOTO score to 48 to indicate increased functional mobility. PN:  53.  1/28/2020.    4. Patient will tolerate reciprocal stair climbing in order to improve her mobility at home. PN:  States she does reciprocal stairs intermittently depending on fatigue and if she is carrying anything. 1/28/2020. Progressing.       PLAN  []  Upgrade activities as tolerated     []  Continue plan of care  []  Update interventions per flow sheet       []  Discharge due to:_  [x]  Other:_ Pt wants to wait to schedule future appointments until going to the doctor on 2/4/20     Antonino Cedillo PTA 1/31/2020  10:36 AM    No future appointments.

## 2020-03-17 NOTE — PROGRESS NOTES
[facility]              [address]        [place], [address] [place]   [phone] fax    Discharge Summary  Patient name: Lew Shaw Start of Care: 2020   Referral source: Marco Antonio Serrano MD : 1950   Medical/Treatment Diagnosis: Lumbar spine pain [M54.5]  Left hip pain [M25.552]  Payor: VA MEDICARE / Plan: VA MEDICARE PART A & B / Product Type: Medicare /  Onset Date:10/2019     Prior Hospitalization: see medical history Provider#: [id number]   Medications: Verified on Patient Summary List    Comorbidities: Anxiety/Panic disorder, Arthritis, Back Pain, BMI 33.5, Depression, Incontinence, Left OCTAVIO, Sleep dysfunction. Prior Level of Function: Independent.  Did gardening.  She noted that she has had difficulty getting up and down off the floor. Visits from Start of Care: 8    Missed Visits: 0  Reporting Period : 20 to 20    Summary of Care:  1. Patient's pain level will be 2-3/10 with activity in order to improve patient's ability to perform normal ADLs. PN:  0/10-3/10. Goal Met.     2. Patient will demonstrate AROM Lumbar Spine WFL to increase ease of ADLs. PN:  AROM L/S flex limited 25%; ext limited 75%; Right SB and Left SB equal bilaterally;  Right and Left Rotation limited 75%.  Pain with extension, Right SB.    Progressing, not met. 3. Patient will increase FOTO score to 48 to indicate increased functional mobility. PN:  53.  Goal Met     4. Patient will tolerate reciprocal stair climbing in order to improve her mobility at home. PN:  States she does reciprocal stairs intermittently depending on fatigue and if she is carrying anything.   Progressing. ASSESSMENT/RECOMMENDATIONS:  Patient reports that she was to follow up with her doctor on 20 and was holding therapy until that reevaluation occurred.     [x]Discontinue therapy: [x]Patient has reached or is progressing toward set goals      []Patient is non-compliant or has abdicated      []Due to lack of appreciable progress towards set goals    Onesimo Peralta, PT 3/17/2020 2:34 PM

## 2020-07-22 RX ORDER — AZITHROMYCIN 500 MG/1
TABLET, FILM COATED ORAL DAILY
Status: ON HOLD | COMMUNITY
End: 2020-10-19

## 2020-07-22 RX ORDER — AMLODIPINE BESYLATE 5 MG/1
5 TABLET ORAL DAILY
COMMUNITY

## 2020-07-22 RX ORDER — MONTELUKAST SODIUM 10 MG/1
10 TABLET ORAL DAILY
COMMUNITY

## 2020-07-22 RX ORDER — AMITRIPTYLINE HYDROCHLORIDE 10 MG/1
TABLET, FILM COATED ORAL
COMMUNITY

## 2020-07-22 RX ORDER — MELOXICAM 15 MG/1
15 TABLET ORAL DAILY
COMMUNITY

## 2020-07-22 RX ORDER — CYCLOBENZAPRINE HCL 5 MG
5 TABLET ORAL
Status: ON HOLD | COMMUNITY
End: 2020-10-19

## 2020-07-22 RX ORDER — FLUOXETINE HYDROCHLORIDE 20 MG/1
CAPSULE ORAL DAILY
COMMUNITY

## 2020-07-22 RX ORDER — IPRATROPIUM BROMIDE 42 UG/1
2 SPRAY, METERED NASAL 4 TIMES DAILY
COMMUNITY

## 2020-07-22 RX ORDER — PANTOPRAZOLE SODIUM 40 MG/1
40 TABLET, DELAYED RELEASE ORAL DAILY
COMMUNITY

## 2020-07-22 RX ORDER — LORAZEPAM 0.5 MG/1
TABLET ORAL
COMMUNITY

## 2020-07-22 RX ORDER — ZOSTER VACCINE RECOMBINANT, ADJUVANTED 50 MCG/0.5
0.5 KIT INTRAMUSCULAR ONCE
Status: ON HOLD | COMMUNITY
End: 2020-10-19

## 2020-07-22 RX ORDER — ALBUTEROL SULFATE 90 UG/1
AEROSOL, METERED RESPIRATORY (INHALATION)
COMMUNITY

## 2020-07-22 RX ORDER — SIMVASTATIN 20 MG/1
TABLET, FILM COATED ORAL
Status: ON HOLD | COMMUNITY
End: 2020-10-19

## 2020-07-31 ENCOUNTER — OFFICE VISIT (OUTPATIENT)
Dept: ORTHOPEDIC SURGERY | Age: 70
End: 2020-07-31
Payer: MEDICARE

## 2020-07-31 DIAGNOSIS — M17.12 PRIMARY OSTEOARTHRITIS OF LEFT KNEE: ICD-10-CM

## 2020-07-31 DIAGNOSIS — M17.11 PRIMARY OSTEOARTHRITIS OF RIGHT KNEE: ICD-10-CM

## 2020-07-31 PROCEDURE — 99203 OFFICE O/P NEW LOW 30 MIN: CPT | Performed by: ORTHOPAEDIC SURGERY

## 2020-07-31 NOTE — PROGRESS NOTES
Providers protocol for the intake nurse to complete in patient's chart:    Rafael Britton presents today for right leg pain from hip to foot    Reason for visit: pain in right leg  Pain assessment:6   Height: 5 foot 4 inches  Weight: 200lb    Tempeture is taken by NANO MOSQUEDA  Travel Screening done by NANO MOSQUEDA    Provider will complete the rest of patient's chart

## 2020-07-31 NOTE — PROGRESS NOTES
Name: Stephy Pan    : 1950  Service Dept: [address] [place] [address] MEDICINE         Chief Complaint   Patient presents with    Leg Pain     right    Knee Pain     right    Hip Pain     right    Foot Pain     right        Patient's Pharmacies:    [facility]/pharmacy [address] [place], [place] [id number]  Phone: [phone] Fax: [phone]       There were no vitals taken for this visit. Allergies   Allergen Reactions    Latex, Natural Rubber Unknown (comments)    Codeine Unknown (comments)        Current Outpatient Medications   Medication Sig Dispense Refill    amitriptyline (ELAVIL) 10 mg tablet Take  by mouth nightly.  amLODIPine (NORVASC) 5 mg tablet Take 5 mg by mouth daily.  FLUoxetine (PROzac) 20 mg capsule Take  by mouth daily.  montelukast (SINGULAIR) 10 mg tablet Take 10 mg by mouth daily.  azithromycin (ZITHROMAX) 500 mg tab Take  by mouth daily.  cyclobenzaprine (FLEXERIL) 5 mg tablet Take 5 mg by mouth.  ipratropium (ATROVENT) 42 mcg (0.06 %) nasal spray 2 Sprays four (4) times daily.  LORazepam (ATIVAN) 0.5 mg tablet Take  by mouth.  measles, mumps & rubella Vacc, PF, (M-M-R II) 1,000-12,500 TCID50/0.5 mL solr injection 0.5 mL by SubCUTAneous route.  meloxicam (MOBIC) 15 mg tablet Take 15 mg by mouth daily.  pantoprazole (PROTONIX) 40 mg tablet Take 40 mg by mouth daily.  varicella-zoster recombinant, PF, (Shingrix, PF,) 50 mcg/0.5 mL susr injection 0.5 mL by IntraMUSCular route once.  simvastatin (ZOCOR) 20 mg tablet Take  by mouth nightly.  albuterol (Ventolin HFA) 90 mcg/actuation inhaler Take  by inhalation. There is no problem list on file for this patient. History reviewed. No pertinent family history.      Social History     Socioeconomic History    Marital status: UNKNOWN     Spouse name: Not on file    Number of children: Not on file    Years of education: Not on file    Highest education level: Not on file   Tobacco Use    Smoking status: Former Smoker    Smokeless tobacco: Never Used   Substance and Sexual Activity    Alcohol use: Yes     Comment: daily        Past Surgical History:   Procedure Laterality Date    FOOT/TOES SURGERY PROC UNLISTED      HX HIP REPLACEMENT          Past Medical History:   Diagnosis Date    Acid reflux     Arthritis     High cholesterol     Hypertension         HPI:   I have reviewed and agree with PFSH and ROS and intake form in chart and the record. Review of Systems:   Patient is a pleasant appearing individual, appropriately dressed, well hydrated, well nourished, who is alert, appropriately oriented for age, and in no acute distress with a normal gait and normal affect who does not appear to be in any significant pain. Physical Exam:  Right Knee -Decrease range of motion with flexion, Knee arc of greater than 50 degrees, Some crepitation, Grossly neurovascularly intact, Good cap refill, No skin lesion, Moderate swelling, No gross instability, Some quadriceps weakness, Kellgren and Jack at least grade 3    Left knee - Neurovascularly intact with good cap refill, full range of motion and full strength, well healed incision noted, no swelling, no erythema, no instability. HPI:  The patient is here with a chief complaint of right knee pain since January progressively getting worse. Pain is 6/10. ROS:  Positive for instability and fatigue. X-rays done at [facility] are positive for bone-on-bone arthritis. Assessment/Plan:  1. Right knee severe OA failed conservative treatment. Plan will be for a cortisone injection to the right knee. If it helps, it is all we need to do. If it does not, we will consider treatment options. We will see the patient back on an as-needed basis. She is scheduled for a right total knee replacement.   We will plan for a right total knee replacement, general medical clearance, outpatient basis and go from there. Return to Office:    Follow-up Information    None

## 2020-07-31 NOTE — PATIENT INSTRUCTIONS
Knee Pain or Injury: Care Instructions  Your Care Instructions     Injuries are a common cause of knee problems. Sudden (acute) injuries may be caused by a direct blow to the knee. They can also be caused by abnormal twisting, bending, or falling on the knee. Pain, bruising, or swelling may be severe, and may start within minutes of the injury. Overuse is another cause of knee pain. Other causes are climbing stairs, kneeling, and other activities that use the knee. Everyday wear and tear, especially as you get older, also can cause knee pain. Rest, along with home treatment, often relieves pain and allows your knee to heal. If you have a serious knee injury, you may need tests and treatment. Follow-up care is a key part of your treatment and safety. Be sure to make and go to all appointments, and call your doctor if you are having problems. It's also a good idea to know your test results and keep a list of the medicines you take. How can you care for yourself at home? · Be safe with medicines. Read and follow all instructions on the label. ? If the doctor gave you a prescription medicine for pain, take it as prescribed. ? If you are not taking a prescription pain medicine, ask your doctor if you can take an over-the-counter medicine. · Rest and protect your knee. Take a break from any activity that may cause pain. · Put ice or a cold pack on your knee for 10 to 20 minutes at a time. Put a thin cloth between the ice and your skin. · Prop up a sore knee on a pillow when you ice it or anytime you sit or lie down for the next 3 days. Try to keep it above the level of your heart. This will help reduce swelling. · If your knee is not swollen, you can put moist heat, a heating pad, or a warm cloth on your knee. · If your doctor recommends an elastic bandage, sleeve, or other type of support for your knee, wear it as directed.   · Follow your doctor's instructions about how much weight you can put on your leg. Use a cane, crutches, or a walker as instructed. · Follow your doctor's instructions about activity during your healing process. If you can do mild exercise, slowly increase your activity. · Reach and stay at a healthy weight. Extra weight can strain the joints, especially the knees and hips, and make the pain worse. Losing even a few pounds may help. When should you call for help? [id number] anytime you think you may need emergency care. For example, call if:  · You have symptoms of a blood clot in your lung (called a pulmonary embolism). These may include:  ? Sudden chest pain. ? Trouble breathing. ? Coughing up blood. Call your doctor now or seek immediate medical care if:  · You have severe or increasing pain. · Your leg or foot turns cold or changes color. · You cannot stand or put weight on your knee. · Your knee looks twisted or bent out of shape. · You cannot move your knee. · You have signs of infection, such as:  ? Increased pain, swelling, warmth, or redness. ? Red streaks leading from the knee. ? Pus draining from a place on your knee. ? A fever. · You have signs of a blood clot in your leg (called a deep vein thrombosis), such as:  ? Pain in your calf, back of the knee, thigh, or groin. ? Redness and swelling in your leg or groin. Watch closely for changes in your health, and be sure to contact your doctor if:  · You have tingling, weakness, or numbness in your knee. · You have any new symptoms, such as swelling. · You have bruises from a knee injury that last longer than 2 weeks. · You do not get better as expected. Where can you learn more? Go to http://teresa-lelo.info/  Enter K195 in the search box to learn more about \"Knee Pain or Injury: Care Instructions. \"  Current as of: June 26, 2019               Content Version: 12.5  © 6153-4358 [employer]wise, [employer].    Care instructions adapted under license by [employer] (which disclaims liability or warranty for this information). If you have questions about a medical condition or this instruction, always ask your healthcare professional. [place] [address] any warranty or liability for your use of this information.

## 2020-09-10 DIAGNOSIS — M17.11 PRIMARY OSTEOARTHRITIS OF RIGHT KNEE: ICD-10-CM

## 2020-10-12 ENCOUNTER — OFFICE VISIT (OUTPATIENT)
Dept: ORTHOPEDIC SURGERY | Age: 70
End: 2020-10-12

## 2020-10-12 ENCOUNTER — HOSPITAL ENCOUNTER (OUTPATIENT)
Dept: PREADMISSION TESTING | Age: 70
Discharge: HOME | End: 2020-10-12
Payer: MEDICARE

## 2020-10-12 DIAGNOSIS — M17.11 PRIMARY OSTEOARTHRITIS OF RIGHT KNEE: Primary | ICD-10-CM

## 2020-10-12 DIAGNOSIS — M17.11 OSTEOARTHRITIS OF RIGHT KNEE, UNSPECIFIED OSTEOARTHRITIS TYPE: ICD-10-CM

## 2020-10-12 LAB — SARS-COV-2, COV2: NORMAL

## 2020-10-12 PROCEDURE — 87635 SARS-COV-2 COVID-19 AMP PRB: CPT

## 2020-10-12 RX ORDER — TRAMADOL HYDROCHLORIDE 50 MG/1
50 TABLET ORAL
Qty: 30 TAB | Refills: 0 | Status: ON HOLD | OUTPATIENT
Start: 2020-10-12 | End: 2020-10-19

## 2020-10-12 RX ORDER — CEPHALEXIN 500 MG/1
500 CAPSULE ORAL EVERY 8 HOURS
Qty: 3 CAP | Refills: 0 | Status: SHIPPED | OUTPATIENT
Start: 2020-10-12 | End: 2020-10-13

## 2020-10-12 NOTE — H&P (VIEW-ONLY)
Name: Kenia Upton : 1950 Service Dept: [address] [place] [address] and Sports Medicine Patient's Pharmacies: [facility]/pharmacy #[id number] Sammy Gustafson [address] [place] [address] Phone: [phone] Fax: [phone] Chief Complaint Patient presents with  Pre-op Exam  
  
 
There were no vitals taken for this visit. Allergies Allergen Reactions  Latex Other (comments) Redness of skin and blisters  Latex, Natural Rubber Unknown (comments)  Other Food Rash Raw vegetables, rash if touched  Adhesive Tape-Silicones Rash  Aloe Vera Rash  Chlorhexidine Other (comments) Redness and Burning sensation  Codeine Nausea and Vomiting  Codeine Unknown (comments)  Other Plant, Animal, Environmental Other (comments) Detergents make skin blister Current Outpatient Medications Medication Sig Dispense Refill  cephALEXin (KEFLEX) 500 mg capsule Take 1 Cap by mouth every eight (8) hours for 3 doses. 3 Cap 0  
 traMADoL (ULTRAM) 50 mg tablet Take 1 Tab by mouth every six (6) hours as needed for Pain for up to 14 days. Max Daily Amount: 200 mg. 30 Tab 0  
 amitriptyline (ELAVIL) 10 mg tablet Take  by mouth nightly.  amLODIPine (NORVASC) 5 mg tablet Take 5 mg by mouth daily.  azithromycin (ZITHROMAX) 500 mg tab Take  by mouth daily.  cyclobenzaprine (FLEXERIL) 5 mg tablet Take 5 mg by mouth.  FLUoxetine (PROzac) 20 mg capsule Take  by mouth daily.  ipratropium (ATROVENT) 42 mcg (0.06 %) nasal spray 2 Sprays four (4) times daily.  LORazepam (ATIVAN) 0.5 mg tablet Take  by mouth.  measles, mumps & rubella Vacc, PF, (M-M-R II) 1,000-12,500 TCID50/0.5 mL solr injection 0.5 mL by SubCUTAneous route.  meloxicam (MOBIC) 15 mg tablet Take 15 mg by mouth daily.  montelukast (SINGULAIR) 10 mg tablet Take 10 mg by mouth daily.  pantoprazole (PROTONIX) 40 mg tablet Take 40 mg by mouth daily.  varicella-zoster recombinant, PF, (Shingrix, PF,) 50 mcg/0.5 mL susr injection 0.5 mL by IntraMUSCular route once.  simvastatin (ZOCOR) 20 mg tablet Take  by mouth nightly.  albuterol (Ventolin HFA) 90 mcg/actuation inhaler Take  by inhalation.  buffered aspirin (BUFFERIN) 325 mg tablet Take 1 Tab by mouth two (2) times daily (with meals). 42 Tab 0  
 oxyCODONE-acetaminophen (PERCOCET) 5-325 mg per tablet Take 1-2 Tabs by mouth every four (4) hours as needed for Pain. Max Daily Amount: 12 Tabs. 60 Tab 0  
 sertraline (ZOLOFT) 50 mg tablet Take 50 mg by mouth daily.  cholecalciferol, VITAMIN D3, (VITAMIN D3) 5,000 unit tab tablet Take 5,000 Units by mouth daily.  magnesium 250 mg tab Take 250 mg by mouth daily.  cetirizine (ZYRTEC) 10 mg tablet Take 10 mg by mouth nightly.  simvastatin (ZOCOR) 20 mg tablet Take 20 mg by mouth nightly.  mupirocin calcium (BACTROBAN) 2 % nasal ointment by Both Nostrils route two (2) times a day. 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  
Diagnosis Code  Osteoarthritis of left hip M16.12 History reviewed. No pertinent family history. Social History Socioeconomic History  Marital status: UNKNOWN Spouse name: Not on file  Number of children: Not on file  Years of education: Not on file  Highest education level: Not on file Tobacco Use  Smoking status: Former Smoker Packs/day: 1.00 Years: 20.00 Pack years: 20.00 Last attempt to quit: 1991 Years since quittin.8  Smokeless tobacco: Never Used Substance and Sexual Activity  Alcohol use: Yes Comment: daily  Drug use: No  
Social History Narrative ** Merged History Encounter ** Past Surgical History:  
Procedure Laterality Date  FOOT/TOES SURGERY PROC UNLISTED    
  HX HIP REPLACEMENT    
 HX ORTHOPAEDIC Right   
 great toe surgery x 2  
 HX TONSILLECTOMY Past Medical History:  
Diagnosis Date  Acid reflux  Arthritis  GERD (gastroesophageal reflux disease)  High cholesterol  Hypertension  Osteoarthritis of left hip 4/4/2015  Psychiatric disorder   
 depression I have reviewed and agree with [address] and ROS and intake form in chart and the record. Review of Systems:  
Patient is a pleasant appearing individual, appropriately dressed, well hydrated, well nourished, who is alert, appropriately oriented for age, and in no acute distress with a normal gait and normal affect who does not appear to be in any significant pain. Physical Exam: 
Right Knee -Decrease range of motion with flexion, Knee arc of greater than 50 degrees, Some crepitation, Grossly neurovascularly intact, Good cap refill, No skin lesion, Moderate swelling, No gross instability, Some quadriceps weakness, Kellgren and Jack at least grade 3 Left Knee - Full Range of Motion, No crepitation, Grossly neurovascularly intact, Good cap refill, No skin lesion, No swelling, No gross instability, No quadriceps weakness Encounter Diagnoses ICD-10-CM ICD-9-CM 1. Primary osteoarthritis of right knee  M17.11 715.16  
2. Osteoarthritis of right knee, unspecified osteoarthritis type  M17.11 715.96  
 
 
  
HPI: 
The patient is here with a chief complaint of right knee pain, diagnosed with osteoarthritis. Failed conservative treatment. Medically cleared. Assessment/Plan: 
Plan would be for right total knee replacement. We are going to proceed once she gets set up for outpatient surgery. She is going to have tramadol and Keflex as outpatient along with aspirin 325 mg for DVT prophylaxis preoperatively. She will have Ancef 2 g, and we will go from there, and Celebrex as well. If she gets worse, she is to give me a call. Schedule for right total knee replacement. Return to Office: Follow-up and Dispositions · Return for already scheduled for surgery. Scribed by Priscilla Bird LPN as dictated by [facility] NIRANJAN Jama MD. Documentation True and Accepted Tim Jama MD

## 2020-10-12 NOTE — PROGRESS NOTES
Name: Bina Regan    : 1950     Service Dept: [address] [place] [address] and Sports Medicine    Patient's Pharmacies:    [id number] [address] Torri Warren  [address] [place] [address]  Phone: [phone] Fax: [phone]       Chief Complaint   Patient presents with    Pre-op Exam        There were no vitals taken for this visit. Allergies   Allergen Reactions    Latex Other (comments)     Redness of skin and blisters    Latex, Natural Rubber Unknown (comments)    Other Food Rash     Raw vegetables, rash if touched    Adhesive Tape-Silicones Rash    Aloe Vera Rash    Chlorhexidine Other (comments)     Redness and Burning sensation    Codeine Nausea and Vomiting    Codeine Unknown (comments)    Other Plant, Animal, Environmental Other (comments)     Detergents make skin blister        Current Outpatient Medications   Medication Sig Dispense Refill    cephALEXin (KEFLEX) 500 mg capsule Take 1 Cap by mouth every eight (8) hours for 3 doses. 3 Cap 0    traMADoL (ULTRAM) 50 mg tablet Take 1 Tab by mouth every six (6) hours as needed for Pain for up to 14 days. Max Daily Amount: 200 mg. 30 Tab 0    amitriptyline (ELAVIL) 10 mg tablet Take  by mouth nightly.  amLODIPine (NORVASC) 5 mg tablet Take 5 mg by mouth daily.  azithromycin (ZITHROMAX) 500 mg tab Take  by mouth daily.  cyclobenzaprine (FLEXERIL) 5 mg tablet Take 5 mg by mouth.  FLUoxetine (PROzac) 20 mg capsule Take  by mouth daily.  ipratropium (ATROVENT) 42 mcg (0.06 %) nasal spray 2 Sprays four (4) times daily.  LORazepam (ATIVAN) 0.5 mg tablet Take  by mouth.  measles, mumps & rubella Vacc, PF, (M-M-R II) 1,000-12,500 TCID50/0.5 mL solr injection 0.5 mL by SubCUTAneous route.  meloxicam (MOBIC) 15 mg tablet Take 15 mg by mouth daily.  montelukast (SINGULAIR) 10 mg tablet Take 10 mg by mouth daily.       pantoprazole (PROTONIX) 40 mg tablet Take 40 mg by mouth daily.  varicella-zoster recombinant, PF, (Shingrix, PF,) 50 mcg/0.5 mL susr injection 0.5 mL by IntraMUSCular route once.  simvastatin (ZOCOR) 20 mg tablet Take  by mouth nightly.  albuterol (Ventolin HFA) 90 mcg/actuation inhaler Take  by inhalation.  buffered aspirin (BUFFERIN) 325 mg tablet Take 1 Tab by mouth two (2) times daily (with meals). 42 Tab 0    oxyCODONE-acetaminophen (PERCOCET) 5-325 mg per tablet Take 1-2 Tabs by mouth every four (4) hours as needed for Pain. Max Daily Amount: 12 Tabs. 60 Tab 0    sertraline (ZOLOFT) 50 mg tablet Take 50 mg by mouth daily.  cholecalciferol, VITAMIN D3, (VITAMIN D3) 5,000 unit tab tablet Take 5,000 Units by mouth daily.  magnesium 250 mg tab Take 250 mg by mouth daily.  cetirizine (ZYRTEC) 10 mg tablet Take 10 mg by mouth nightly.  simvastatin (ZOCOR) 20 mg tablet Take 20 mg by mouth nightly.  mupirocin calcium (BACTROBAN) 2 % nasal ointment by Both Nostrils route two (2) times a day. 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 No pertinent family history.      Social History     Socioeconomic History    Marital status: UNKNOWN     Spouse name: Not on file    Number of children: Not on file    Years of education: Not on file    Highest education level: Not on file   Tobacco Use    Smoking status: Former Smoker     Packs/day: 1.00     Years: 20.00     Pack years: 20.00     Last attempt to quit: 1991     Years since quittin.8    Smokeless tobacco: Never Used   Substance and Sexual Activity    Alcohol use: Yes     Comment: daily    Drug use: No   Social History Narrative    ** Merged History Encounter **             Past Surgical History:   Procedure Laterality Date    FOOT/TOES SURGERY PROC UNLISTED      HX HIP REPLACEMENT      HX ORTHOPAEDIC Right great toe surgery x 2    HX TONSILLECTOMY          Past Medical History:   Diagnosis Date    Acid reflux     Arthritis     GERD (gastroesophageal reflux disease)     High cholesterol     Hypertension     Osteoarthritis of left hip 4/4/2015    Psychiatric disorder     depression        I have reviewed and agree with PFSH and ROS and intake form in chart and the record. Review of Systems:   Patient is a pleasant appearing individual, appropriately dressed, well hydrated, well nourished, who is alert, appropriately oriented for age, and in no acute distress with a normal gait and normal affect who does not appear to be in any significant pain. Physical Exam:  Right Knee -Decrease range of motion with flexion, Knee arc of greater than 50 degrees, Some crepitation, Grossly neurovascularly intact, Good cap refill, No skin lesion, Moderate swelling, No gross instability, Some quadriceps weakness, Kellgren and Jack at least grade 3    Left Knee - Full Range of Motion, No crepitation, Grossly neurovascularly intact, Good cap refill, No skin lesion, No swelling, No gross instability, No quadriceps weakness       Encounter Diagnoses     ICD-10-CM ICD-9-CM   1. Primary osteoarthritis of right knee  M17.11 715.16   2. Osteoarthritis of right knee, unspecified osteoarthritis type  M17.11 715.96          HPI:  The patient is here with a chief complaint of right knee pain, diagnosed with osteoarthritis. Failed conservative treatment. Medically cleared. Assessment/Plan:  Plan would be for right total knee replacement. We are going to proceed once she gets set up for outpatient surgery. She is going to have tramadol and Keflex as outpatient along with aspirin 325 mg for DVT prophylaxis preoperatively. She will have Ancef 2 g, and we will go from there, and Celebrex as well. If she gets worse, she is to give me a call. Schedule for right total knee replacement. Return to Office:    Follow-up and Dispositions    · Return for already scheduled for surgery. Scribed by Eber Schroeder LPN as dictated by [facility] NIRANJAN Miller MD.    Documentation True and Accepted Tim Miller MD

## 2020-10-12 NOTE — PATIENT INSTRUCTIONS
Knee Pain or Injury: Care Instructions  Your Care Instructions     Injuries are a common cause of knee problems. Sudden (acute) injuries may be caused by a direct blow to the knee. They can also be caused by abnormal twisting, bending, or falling on the knee. Pain, bruising, or swelling may be severe, and may start within minutes of the injury. Overuse is another cause of knee pain. Other causes are climbing stairs, kneeling, and other activities that use the knee. Everyday wear and tear, especially as you get older, also can cause knee pain. Rest, along with home treatment, often relieves pain and allows your knee to heal. If you have a serious knee injury, you may need tests and treatment. Follow-up care is a key part of your treatment and safety. Be sure to make and go to all appointments, and call your doctor if you are having problems. It's also a good idea to know your test results and keep a list of the medicines you take. How can you care for yourself at home? · Be safe with medicines. Read and follow all instructions on the label. ? If the doctor gave you a prescription medicine for pain, take it as prescribed. ? If you are not taking a prescription pain medicine, ask your doctor if you can take an over-the-counter medicine. · Rest and protect your knee. Take a break from any activity that may cause pain. · Put ice or a cold pack on your knee for 10 to 20 minutes at a time. Put a thin cloth between the ice and your skin. · Prop up a sore knee on a pillow when you ice it or anytime you sit or lie down for the next 3 days. Try to keep it above the level of your heart. This will help reduce swelling. · If your knee is not swollen, you can put moist heat, a heating pad, or a warm cloth on your knee. · If your doctor recommends an elastic bandage, sleeve, or other type of support for your knee, wear it as directed.   · Follow your doctor's instructions about how much weight you can put on your leg. Use a cane, crutches, or a walker as instructed. · Follow your doctor's instructions about activity during your healing process. If you can do mild exercise, slowly increase your activity. · Reach and stay at a healthy weight. Extra weight can strain the joints, especially the knees and hips, and make the pain worse. Losing even a few pounds may help. When should you call for help? Call 911 anytime you think you may need emergency care. For example, call if:    · You have symptoms of a blood clot in your lung (called a pulmonary embolism). These may include:  ? Sudden chest pain. ? Trouble breathing. ? Coughing up blood. Call your doctor now or seek immediate medical care if:    · You have severe or increasing pain.     · Your leg or foot turns cold or changes color.     · You cannot stand or put weight on your knee.     · Your knee looks twisted or bent out of shape.     · You cannot move your knee.     · You have signs of infection, such as:  ? Increased pain, swelling, warmth, or redness. ? Red streaks leading from the knee. ? Pus draining from a place on your knee. ? A fever.     · You have signs of a blood clot in your leg (called a deep vein thrombosis), such as:  ? Pain in your calf, back of the knee, thigh, or groin. ? Redness and swelling in your leg or groin. Watch closely for changes in your health, and be sure to contact your doctor if:    · You have tingling, weakness, or numbness in your knee.     · You have any new symptoms, such as swelling.     · You have bruises from a knee injury that last longer than 2 weeks.     · You do not get better as expected. Where can you learn more? Go to http://www.gray.com/  Enter K195 in the search box to learn more about \"Knee Pain or Injury: Care Instructions. \"  Current as of: June 26, 2019               Content Version: 12.6  © 9014-0857 [employer].    Care instructions adapted under license by [employer] (which disclaims liability or warranty for this information). If you have questions about a medical condition or this instruction, always ask your healthcare professional. [address] any warranty or liability for your use of this information.

## 2020-10-14 DIAGNOSIS — M17.11 PRIMARY OSTEOARTHRITIS OF RIGHT KNEE: Primary | ICD-10-CM

## 2020-10-16 ENCOUNTER — ANESTHESIA EVENT (OUTPATIENT)
Dept: SURGERY | Age: 70
End: 2020-10-16
Payer: MEDICARE

## 2020-10-16 LAB — SARS-COV-2, COV2NT: NOT DETECTED

## 2020-10-19 ENCOUNTER — APPOINTMENT (OUTPATIENT)
Dept: GENERAL RADIOLOGY | Age: 70
End: 2020-10-19
Attending: ORTHOPAEDIC SURGERY
Payer: MEDICARE

## 2020-10-19 ENCOUNTER — ANESTHESIA (OUTPATIENT)
Dept: SURGERY | Age: 70
End: 2020-10-19
Payer: MEDICARE

## 2020-10-19 ENCOUNTER — HOSPITAL ENCOUNTER (OUTPATIENT)
Age: 70
Setting detail: OBSERVATION
Discharge: HOME | End: 2020-10-19
Attending: ORTHOPAEDIC SURGERY | Admitting: ORTHOPAEDIC SURGERY
Payer: MEDICARE

## 2020-10-19 VITALS
HEART RATE: 85 BPM | RESPIRATION RATE: 18 BRPM | SYSTOLIC BLOOD PRESSURE: 122 MMHG | TEMPERATURE: 97.1 F | OXYGEN SATURATION: 98 % | BODY MASS INDEX: 33.29 KG/M2 | DIASTOLIC BLOOD PRESSURE: 60 MMHG | HEIGHT: 64 IN | WEIGHT: 195 LBS

## 2020-10-19 PROBLEM — M17.9 KNEE OSTEOARTHRITIS: Status: ACTIVE | Noted: 2020-10-19

## 2020-10-19 LAB
ABO + RH BLD: NORMAL
BLOOD GROUP ANTIBODIES SERPL: NEGATIVE
SPECIMEN EXP DATE BLD: NORMAL

## 2020-10-19 PROCEDURE — 76060000035 HC ANESTHESIA 2 TO 2.5 HR: Performed by: ORTHOPAEDIC SURGERY

## 2020-10-19 PROCEDURE — 74011000250 HC RX REV CODE- 250: Performed by: ORTHOPAEDIC SURGERY

## 2020-10-19 PROCEDURE — 77030000032 HC CUF TRNQT ZIMM -B: Performed by: ORTHOPAEDIC SURGERY

## 2020-10-19 PROCEDURE — 77030010783 HC BOWL MX BN CEM J&J -B: Performed by: ORTHOPAEDIC SURGERY

## 2020-10-19 PROCEDURE — 76010000131 HC OR TIME 2 TO 2.5 HR: Performed by: ORTHOPAEDIC SURGERY

## 2020-10-19 PROCEDURE — 77030006835 HC BLD SAW SAG STRY -B: Performed by: ORTHOPAEDIC SURGERY

## 2020-10-19 PROCEDURE — C1713 ANCHOR/SCREW BN/BN,TIS/BN: HCPCS | Performed by: ORTHOPAEDIC SURGERY

## 2020-10-19 PROCEDURE — 76210000063 HC OR PH I REC FIRST 0.5 HR: Performed by: ORTHOPAEDIC SURGERY

## 2020-10-19 PROCEDURE — 77030018673: Performed by: ORTHOPAEDIC SURGERY

## 2020-10-19 PROCEDURE — 77030013708 HC HNDPC SUC IRR PULS STRY –B: Performed by: ORTHOPAEDIC SURGERY

## 2020-10-19 PROCEDURE — 74011250636 HC RX REV CODE- 250/636: Performed by: ORTHOPAEDIC SURGERY

## 2020-10-19 PROCEDURE — 77030031139 HC SUT VCRL2 J&J -A: Performed by: ORTHOPAEDIC SURGERY

## 2020-10-19 PROCEDURE — 77030041690 HC SYS PINNING KN JNJ -D: Performed by: ORTHOPAEDIC SURGERY

## 2020-10-19 PROCEDURE — 74011000258 HC RX REV CODE- 258: Performed by: NURSE ANESTHETIST, CERTIFIED REGISTERED

## 2020-10-19 PROCEDURE — 76210000020 HC REC RM PH II FIRST 0.5 HR: Performed by: ORTHOPAEDIC SURGERY

## 2020-10-19 PROCEDURE — 74011000272 HC RX REV CODE- 272: Performed by: ORTHOPAEDIC SURGERY

## 2020-10-19 PROCEDURE — 97110 THERAPEUTIC EXERCISES: CPT

## 2020-10-19 PROCEDURE — 77030002933 HC SUT MCRYL J&J -A: Performed by: ORTHOPAEDIC SURGERY

## 2020-10-19 PROCEDURE — 77030033138 HC SUT PGA STRATFX J&J -B: Performed by: ORTHOPAEDIC SURGERY

## 2020-10-19 PROCEDURE — 74011250636 HC RX REV CODE- 250/636: Performed by: NURSE ANESTHETIST, CERTIFIED REGISTERED

## 2020-10-19 PROCEDURE — 99218 HC RM OBSERVATION: CPT

## 2020-10-19 PROCEDURE — 74011000250 HC RX REV CODE- 250: Performed by: NURSE ANESTHETIST, CERTIFIED REGISTERED

## 2020-10-19 PROCEDURE — 77030011266 HC ELECTRD BLD INSL COVD -A: Performed by: ORTHOPAEDIC SURGERY

## 2020-10-19 PROCEDURE — 77030029372 HC ADH SKN CLSR PRINEO J&J -C: Performed by: ORTHOPAEDIC SURGERY

## 2020-10-19 PROCEDURE — 86900 BLOOD TYPING SEROLOGIC ABO: CPT

## 2020-10-19 PROCEDURE — 77030042022 HC SYST COLD THRPY BREG -B: Performed by: ORTHOPAEDIC SURGERY

## 2020-10-19 PROCEDURE — 77030006812 HC BLD SAW RECIP STRY -B: Performed by: ORTHOPAEDIC SURGERY

## 2020-10-19 PROCEDURE — 73560 X-RAY EXAM OF KNEE 1 OR 2: CPT

## 2020-10-19 PROCEDURE — 77030040375: Performed by: ORTHOPAEDIC SURGERY

## 2020-10-19 PROCEDURE — 2709999900 HC NON-CHARGEABLE SUPPLY: Performed by: ORTHOPAEDIC SURGERY

## 2020-10-19 PROCEDURE — C1776 JOINT DEVICE (IMPLANTABLE): HCPCS | Performed by: ORTHOPAEDIC SURGERY

## 2020-10-19 PROCEDURE — 97161 PT EVAL LOW COMPLEX 20 MIN: CPT

## 2020-10-19 PROCEDURE — 97530 THERAPEUTIC ACTIVITIES: CPT

## 2020-10-19 PROCEDURE — 77030039147 HC PWDR HEMSTS SURGICEL JNJ -D: Performed by: ORTHOPAEDIC SURGERY

## 2020-10-19 PROCEDURE — 77030002982 HC SUT POLYSRB J&J -A: Performed by: ORTHOPAEDIC SURGERY

## 2020-10-19 PROCEDURE — 74011000258 HC RX REV CODE- 258: Performed by: ORTHOPAEDIC SURGERY

## 2020-10-19 PROCEDURE — 74011250637 HC RX REV CODE- 250/637: Performed by: ORTHOPAEDIC SURGERY

## 2020-10-19 PROCEDURE — 97116 GAIT TRAINING THERAPY: CPT

## 2020-10-19 DEVICE — BASEPLATE TIB SZ 6 ROT PLATFRM CO CHROM MOLYBDENUM TI ALLY: Type: IMPLANTABLE DEVICE | Site: KNEE | Status: FUNCTIONAL

## 2020-10-19 DEVICE — INSERT TIB SZ 7 THK5MM KNEE POST STBL ROT PLATFRM ATTUNE: Type: IMPLANTABLE DEVICE | Site: KNEE | Status: FUNCTIONAL

## 2020-10-19 DEVICE — COMPONENT PAT DIA35MM KNEE POLY DOME CEM MEDIALIZED ATTUNE: Type: IMPLANTABLE DEVICE | Site: KNEE | Status: FUNCTIONAL

## 2020-10-19 DEVICE — COMPONENT FEM SZ 7 R KNEE POST STBL CEM ATTUNE: Type: IMPLANTABLE DEVICE | Site: KNEE | Status: FUNCTIONAL

## 2020-10-19 RX ORDER — ASPIRIN 325 MG
325 TABLET ORAL 2 TIMES DAILY
COMMUNITY

## 2020-10-19 RX ORDER — SODIUM CHLORIDE 0.9 % (FLUSH) 0.9 %
5-40 SYRINGE (ML) INJECTION EVERY 8 HOURS
Status: DISCONTINUED | OUTPATIENT
Start: 2020-10-19 | End: 2020-10-19 | Stop reason: HOSPADM

## 2020-10-19 RX ORDER — ACETAMINOPHEN 325 MG/1
650 TABLET ORAL
Status: DISCONTINUED | OUTPATIENT
Start: 2020-10-19 | End: 2020-10-19 | Stop reason: HOSPADM

## 2020-10-19 RX ORDER — DEXTROSE 50 % IN WATER (D50W) INTRAVENOUS SYRINGE
25-50 AS NEEDED
Status: CANCELLED | OUTPATIENT
Start: 2020-10-19

## 2020-10-19 RX ORDER — HYDROMORPHONE HYDROCHLORIDE 2 MG/1
2 TABLET ORAL
Status: DISCONTINUED | OUTPATIENT
Start: 2020-10-19 | End: 2020-10-19 | Stop reason: HOSPADM

## 2020-10-19 RX ORDER — PROPOFOL 10 MG/ML
INJECTION, EMULSION INTRAVENOUS AS NEEDED
Status: DISCONTINUED | OUTPATIENT
Start: 2020-10-19 | End: 2020-10-19 | Stop reason: HOSPADM

## 2020-10-19 RX ORDER — BUPIVACAINE HYDROCHLORIDE 7.5 MG/ML
INJECTION, SOLUTION EPIDURAL; RETROBULBAR
Status: SHIPPED | OUTPATIENT
Start: 2020-10-19 | End: 2020-10-19

## 2020-10-19 RX ORDER — DEXAMETHASONE SODIUM PHOSPHATE 4 MG/ML
INJECTION, SOLUTION INTRA-ARTICULAR; INTRALESIONAL; INTRAMUSCULAR; INTRAVENOUS; SOFT TISSUE
Status: SHIPPED | OUTPATIENT
Start: 2020-10-19 | End: 2020-10-19

## 2020-10-19 RX ORDER — FACIAL-BODY WIPES
10 EACH TOPICAL DAILY PRN
Status: DISCONTINUED | OUTPATIENT
Start: 2020-10-19 | End: 2020-10-19 | Stop reason: HOSPADM

## 2020-10-19 RX ORDER — CELECOXIB 200 MG/1
400 CAPSULE ORAL ONCE
Status: COMPLETED | OUTPATIENT
Start: 2020-10-19 | End: 2020-10-19

## 2020-10-19 RX ORDER — ONDANSETRON 2 MG/ML
4 INJECTION INTRAMUSCULAR; INTRAVENOUS ONCE
Status: DISCONTINUED | OUTPATIENT
Start: 2020-10-19 | End: 2020-10-19 | Stop reason: HOSPADM

## 2020-10-19 RX ORDER — SODIUM CHLORIDE, SODIUM LACTATE, POTASSIUM CHLORIDE, CALCIUM CHLORIDE 600; 310; 30; 20 MG/100ML; MG/100ML; MG/100ML; MG/100ML
25 INJECTION, SOLUTION INTRAVENOUS CONTINUOUS
Status: DISCONTINUED | OUTPATIENT
Start: 2020-10-19 | End: 2020-10-19 | Stop reason: HOSPADM

## 2020-10-19 RX ORDER — SODIUM CHLORIDE 0.9 % (FLUSH) 0.9 %
5-40 SYRINGE (ML) INJECTION AS NEEDED
Status: DISCONTINUED | OUTPATIENT
Start: 2020-10-19 | End: 2020-10-19 | Stop reason: HOSPADM

## 2020-10-19 RX ORDER — ALBUTEROL SULFATE 0.83 MG/ML
2.5 SOLUTION RESPIRATORY (INHALATION)
Status: DISCONTINUED | OUTPATIENT
Start: 2020-10-19 | End: 2020-10-19 | Stop reason: HOSPADM

## 2020-10-19 RX ORDER — FENTANYL CITRATE 50 UG/ML
INJECTION, SOLUTION INTRAMUSCULAR; INTRAVENOUS AS NEEDED
Status: DISCONTINUED | OUTPATIENT
Start: 2020-10-19 | End: 2020-10-19 | Stop reason: HOSPADM

## 2020-10-19 RX ORDER — KETAMINE HYDROCHLORIDE 10 MG/ML
INJECTION, SOLUTION INTRAMUSCULAR; INTRAVENOUS AS NEEDED
Status: DISCONTINUED | OUTPATIENT
Start: 2020-10-19 | End: 2020-10-19 | Stop reason: HOSPADM

## 2020-10-19 RX ORDER — SENNOSIDES 8.6 MG/1
1 TABLET ORAL 2 TIMES DAILY
Status: DISCONTINUED | OUTPATIENT
Start: 2020-10-19 | End: 2020-10-19 | Stop reason: HOSPADM

## 2020-10-19 RX ORDER — TRAMADOL HYDROCHLORIDE 50 MG/1
50 TABLET ORAL
Status: DISCONTINUED | OUTPATIENT
Start: 2020-10-19 | End: 2020-10-19 | Stop reason: HOSPADM

## 2020-10-19 RX ORDER — BUPIVACAINE HYDROCHLORIDE AND EPINEPHRINE 2.5; 5 MG/ML; UG/ML
INJECTION, SOLUTION INFILTRATION; PERINEURAL AS NEEDED
Status: DISCONTINUED | OUTPATIENT
Start: 2020-10-19 | End: 2020-10-19 | Stop reason: HOSPADM

## 2020-10-19 RX ORDER — DEXTROSE 50 % IN WATER (D50W) INTRAVENOUS SYRINGE
25-50 AS NEEDED
Status: DISCONTINUED | OUTPATIENT
Start: 2020-10-19 | End: 2020-10-19 | Stop reason: HOSPADM

## 2020-10-19 RX ORDER — MAGNESIUM SULFATE 100 %
4 CRYSTALS MISCELLANEOUS AS NEEDED
Status: CANCELLED | OUTPATIENT
Start: 2020-10-19

## 2020-10-19 RX ORDER — DIPHENHYDRAMINE HYDROCHLORIDE 50 MG/ML
12.5 INJECTION, SOLUTION INTRAMUSCULAR; INTRAVENOUS
Status: DISCONTINUED | OUTPATIENT
Start: 2020-10-19 | End: 2020-10-19 | Stop reason: HOSPADM

## 2020-10-19 RX ORDER — ASPIRIN 325 MG
325 TABLET, DELAYED RELEASE (ENTERIC COATED) ORAL 2 TIMES DAILY
Status: DISCONTINUED | OUTPATIENT
Start: 2020-10-20 | End: 2020-10-19 | Stop reason: HOSPADM

## 2020-10-19 RX ORDER — FENTANYL CITRATE 50 UG/ML
50 INJECTION, SOLUTION INTRAMUSCULAR; INTRAVENOUS AS NEEDED
Status: DISCONTINUED | OUTPATIENT
Start: 2020-10-19 | End: 2020-10-19 | Stop reason: HOSPADM

## 2020-10-19 RX ORDER — FENTANYL CITRATE 50 UG/ML
50 INJECTION, SOLUTION INTRAMUSCULAR; INTRAVENOUS
Status: DISCONTINUED | OUTPATIENT
Start: 2020-10-19 | End: 2020-10-19 | Stop reason: HOSPADM

## 2020-10-19 RX ORDER — MAGNESIUM SULFATE 100 %
4 CRYSTALS MISCELLANEOUS AS NEEDED
Status: DISCONTINUED | OUTPATIENT
Start: 2020-10-19 | End: 2020-10-19 | Stop reason: HOSPADM

## 2020-10-19 RX ORDER — FLUMAZENIL 0.1 MG/ML
0.2 INJECTION INTRAVENOUS
Status: DISCONTINUED | OUTPATIENT
Start: 2020-10-19 | End: 2020-10-19 | Stop reason: HOSPADM

## 2020-10-19 RX ORDER — GABAPENTIN 300 MG/1
300 CAPSULE ORAL ONCE
Status: COMPLETED | OUTPATIENT
Start: 2020-10-19 | End: 2020-10-19

## 2020-10-19 RX ORDER — NALOXONE HYDROCHLORIDE 0.4 MG/ML
0.4 INJECTION, SOLUTION INTRAMUSCULAR; INTRAVENOUS; SUBCUTANEOUS AS NEEDED
Status: DISCONTINUED | OUTPATIENT
Start: 2020-10-19 | End: 2020-10-19 | Stop reason: HOSPADM

## 2020-10-19 RX ORDER — NALOXONE HYDROCHLORIDE 0.4 MG/ML
0.1 INJECTION, SOLUTION INTRAMUSCULAR; INTRAVENOUS; SUBCUTANEOUS
Status: DISCONTINUED | OUTPATIENT
Start: 2020-10-19 | End: 2020-10-19 | Stop reason: HOSPADM

## 2020-10-19 RX ORDER — ONDANSETRON 2 MG/ML
4 INJECTION INTRAMUSCULAR; INTRAVENOUS
Status: DISCONTINUED | OUTPATIENT
Start: 2020-10-19 | End: 2020-10-19 | Stop reason: HOSPADM

## 2020-10-19 RX ORDER — MIDAZOLAM HYDROCHLORIDE 1 MG/ML
INJECTION, SOLUTION INTRAMUSCULAR; INTRAVENOUS AS NEEDED
Status: DISCONTINUED | OUTPATIENT
Start: 2020-10-19 | End: 2020-10-19 | Stop reason: HOSPADM

## 2020-10-19 RX ORDER — ACETAMINOPHEN 500 MG
1000 TABLET ORAL ONCE
Status: COMPLETED | OUTPATIENT
Start: 2020-10-19 | End: 2020-10-19

## 2020-10-19 RX ORDER — BUPIVACAINE HYDROCHLORIDE 2.5 MG/ML
INJECTION, SOLUTION EPIDURAL; INFILTRATION; INTRACAUDAL
Status: SHIPPED | OUTPATIENT
Start: 2020-10-19 | End: 2020-10-19

## 2020-10-19 RX ORDER — DIPHENHYDRAMINE HCL 25 MG
25 CAPSULE ORAL
Status: DISCONTINUED | OUTPATIENT
Start: 2020-10-19 | End: 2020-10-19 | Stop reason: HOSPADM

## 2020-10-19 RX ORDER — KETOROLAC TROMETHAMINE 30 MG/ML
15 INJECTION, SOLUTION INTRAMUSCULAR; INTRAVENOUS
Status: DISCONTINUED | OUTPATIENT
Start: 2020-10-19 | End: 2020-10-19 | Stop reason: HOSPADM

## 2020-10-19 RX ADMIN — TRANEXAMIC ACID 1 G: 1 INJECTION, SOLUTION INTRAVENOUS at 12:04

## 2020-10-19 RX ADMIN — BUPIVACAINE HYDROCHLORIDE 30 ML: 2.5 INJECTION, SOLUTION EPIDURAL; INFILTRATION; INTRACAUDAL at 09:50

## 2020-10-19 RX ADMIN — CEFAZOLIN SODIUM 2 G: 1 INJECTION, POWDER, FOR SOLUTION INTRAMUSCULAR; INTRAVENOUS at 10:25

## 2020-10-19 RX ADMIN — PROPOFOL 50 MG: 10 INJECTION, EMULSION INTRAVENOUS at 11:00

## 2020-10-19 RX ADMIN — ACETAMINOPHEN 1000 MG: 500 TABLET, FILM COATED ORAL at 07:49

## 2020-10-19 RX ADMIN — PROPOFOL 50 MG: 10 INJECTION, EMULSION INTRAVENOUS at 11:55

## 2020-10-19 RX ADMIN — CELECOXIB 400 MG: 200 CAPSULE ORAL at 07:49

## 2020-10-19 RX ADMIN — MIDAZOLAM 1 MG: 1 INJECTION INTRAMUSCULAR; INTRAVENOUS at 10:25

## 2020-10-19 RX ADMIN — MIDAZOLAM 2 MG: 1 INJECTION INTRAMUSCULAR; INTRAVENOUS at 10:29

## 2020-10-19 RX ADMIN — MIDAZOLAM 1 MG: 1 INJECTION INTRAMUSCULAR; INTRAVENOUS at 09:46

## 2020-10-19 RX ADMIN — FENTANYL CITRATE 100 MCG: 50 INJECTION, SOLUTION INTRAMUSCULAR; INTRAVENOUS at 10:29

## 2020-10-19 RX ADMIN — BUPIVACAINE HYDROCHLORIDE 12 MG: 7.5 INJECTION, SOLUTION EPIDURAL; RETROBULBAR at 10:35

## 2020-10-19 RX ADMIN — PROPOFOL 50 MG: 10 INJECTION, EMULSION INTRAVENOUS at 11:30

## 2020-10-19 RX ADMIN — KETAMINE HYDROCHLORIDE 10 MG: 10 INJECTION, SOLUTION INTRAMUSCULAR; INTRAVENOUS at 11:33

## 2020-10-19 RX ADMIN — PROPOFOL 50 MG: 10 INJECTION, EMULSION INTRAVENOUS at 12:30

## 2020-10-19 RX ADMIN — GABAPENTIN 300 MG: 300 CAPSULE ORAL at 07:49

## 2020-10-19 RX ADMIN — DEXAMETHASONE SODIUM PHOSPHATE 4 MG: 4 INJECTION, SOLUTION INTRAMUSCULAR; INTRAVENOUS at 09:50

## 2020-10-19 RX ADMIN — SODIUM CHLORIDE, POTASSIUM CHLORIDE, SODIUM LACTATE AND CALCIUM CHLORIDE: 600; 310; 30; 20 INJECTION, SOLUTION INTRAVENOUS at 12:01

## 2020-10-19 RX ADMIN — TRANEXAMIC ACID 1 G: 1 INJECTION, SOLUTION INTRAVENOUS at 10:42

## 2020-10-19 RX ADMIN — KETAMINE HYDROCHLORIDE 20 MG: 10 INJECTION, SOLUTION INTRAMUSCULAR; INTRAVENOUS at 10:41

## 2020-10-19 RX ADMIN — SODIUM CHLORIDE, POTASSIUM CHLORIDE, SODIUM LACTATE AND CALCIUM CHLORIDE 25 ML/HR: 600; 310; 30; 20 INJECTION, SOLUTION INTRAVENOUS at 08:05

## 2020-10-19 RX ADMIN — KETAMINE HYDROCHLORIDE 10 MG: 10 INJECTION, SOLUTION INTRAMUSCULAR; INTRAVENOUS at 11:10

## 2020-10-19 RX ADMIN — KETAMINE HYDROCHLORIDE 10 MG: 10 INJECTION, SOLUTION INTRAMUSCULAR; INTRAVENOUS at 10:49

## 2020-10-19 NOTE — ANESTHESIA POSTPROCEDURE EVALUATION
Procedure(s):  RIGHT TKA (HIGH BMI) (OUTPATIENT).     spinal    Anesthesia Post Evaluation      Multimodal analgesia: multimodal analgesia not used between 6 hours prior to anesthesia start to PACU discharge  Patient location during evaluation: bedside  Patient participation: complete - patient participated  Level of consciousness: awake and alert  Pain management: adequate  Airway patency: patent  Anesthetic complications: no  Cardiovascular status: acceptable and stable  Respiratory status: acceptable, spontaneous ventilation and room air  Hydration status: acceptable  Comments: Ok to discharge when post op criteria met  Post anesthesia nausea and vomiting:  none  Final Post Anesthesia Temperature Assessment:  Normothermia (36.0-37.5 degrees C)      INITIAL Post-op Vital signs:   Vitals Value Taken Time   /73 10/19/2020 12:53 PM   Temp 36.7 °C (98 °F) 10/19/2020 12:53 PM   Pulse 75 10/19/2020 12:53 PM   Resp 12 10/19/2020 12:53 PM   SpO2 98 % 10/19/2020 12:53 PM

## 2020-10-19 NOTE — DISCHARGE INSTRUCTIONS
TOTAL KNEE REPLACEMENT DISCHARGE INFORMATION    You have undergone a Total Knee Replacement. The following list is to provide you with some expectations over the next week upon your discharge from the hospital.     1. Please continue your Aspirin 325mg every 12 hours (twice daily) or any other blood thinner as directed until Dr. Nicola Mora instructs you to discontinue it. If you are not sure which blood thinner to take please contact Dr. Roswell Bence office next business day for clarification. 2. Please be sure to continue your thigh-high compression stockings on both sides until instructed to discontinue them. 3. Over the course of the next week, you should continue BOTH thigh high stockings, DO NOT GET THE INCISION WET until instructed to do so. 4. If an Ace wrap is placed on your knee you may remove the Ace wrap only 48 hours after your surgery. We will leave the stockings on.  5. During the course of your  over the next week, should you experience fevers of 101.5 F, a white drainage from the incision, extreme redness around the incision, or the incision begins to have a pungent smell; Please call our office or page Dr. Nicola Mora whose numbers are provided in your discharge paperwork. To Page Dr. Nicola Mora please call [phone] and dial 0. Have the  page whomever is on call for Orthopedics. These are signs of infection and it should be addressed immediately. 6. Please do not drive until instructed to do so.  7. It is very important for you to begin your Outpatient Physical Therapy within a couple days of the day of your discharge and your appointment should have been set up. If your physical therapy has not been set up please call our office the next business day for assistance. Details provided in a separate sheet. 8. Remove ace wrap in 48 hrs after surgery but keep stockings on.  9. Finish all antibiotics, start the antibiotics as soon as you go home if you have prescribed antibiotics.   10.  You should perform your daily home exercises at least 4 times a day 30 minutes each time. 11.  Do not place anything under your knee while sleeping at night. Elevate your heel so your  is straight while sleeping at night. 12.  Perform deep breathing exercises 10 times every hour while awake. 13.  If you had a nerve block and you are not having pain the day of the surgery, at nighttime it is okay to take 1 pain medication before going to sleep to help prevent excruciating pain when the nerve block wears off. 14.  You may be given an ice pack machine use that to help prevent swelling. Do not apply heat to the incision area. 15.  While you are awake at least 10 times every 30 minutes move your foot up and down as if you are pumping gas from both feet to help prevent swelling and to promote blood circulation in the calf. 16.  If you develop sudden onset of shortness of breath or severe calf pain please go to closest emergency room. Things to watch for:             Increased swelling of the surgical site             Spreading of redness around the incision site             Drainage of pus from the incision site             Developing a fever of 101.5 °F or higher             If any of these symptoms occur you have any questions please contact our office at [phone]. If you need to talk to Dr. Evangelina Beckwith on an urgent basis please call the hospital at [phone]. 0 for the . Please let the  know you are a surgical patient of Dr. Evangelina Beckwith and you wish to get in contact with him. If Dr. Evangelina Beckwith or his staff do not call you back within 30 minutes. Please tell the  to try again. Phone: [phone]  Www. [email or web address]        POLAR CARE INSTRUCTIONS:     DAY 1-3 WEAR CONTINUOUSLY WHILE AWAKE.  INSPECT SKIN EVERY 1-2 HOURS   MAKE SURE YOU PLACE A TOWEL IN BETWEEN SKIN AND POLAR PAD.  DAY 4 AND ON USE AS NEEDED FOR PAIN CONTROL FOR 1 HOUR INTERVALS; NOT TO EXCEED 12 HOURS/DAY.

## 2020-10-19 NOTE — ANESTHESIA PROCEDURE NOTES
Spinal Block    Start time: 10/19/2020 10:30 AM  End time: 10/19/2020 10:35 AM  Performed by: Ezequiel Antony CRNA  Authorized by: Ezequiel Antony CRNA     Pre-procedure:   Indications: primary anesthetic  Preanesthetic Checklist: patient identified, risks and benefits discussed, anesthesia consent, site marked, patient being monitored and timeout performed    Timeout Time: 10:30          Spinal Block:   Patient Position:  Seated  Prep Region:  Lumbar  Prep: Betadine      Location:  L3-4  Technique:  Single shot        Needle:   Needle Type:  Pencan  Needle Gauge:  25 G  Attempts:  1      Events: CSF confirmed, no blood with aspiration and no paresthesia        Assessment:  Insertion:  Uncomplicated  Patient tolerance:  Patient tolerated the procedure well with no immediate complications

## 2020-10-19 NOTE — ANESTHESIA PREPROCEDURE EVALUATION
Relevant Problems   No relevant active problems       Anesthetic History   No history of anesthetic complications            Review of Systems / Medical History  Patient summary reviewed, nursing notes reviewed and pertinent labs reviewed    Pulmonary  Within defined limits            Pertinent negatives: Smoker: former smoker.      Neuro/Psych         Psychiatric history     Cardiovascular    Hypertension: well controlled          Hyperlipidemia    Exercise tolerance: >4 METS     GI/Hepatic/Renal     GERD: well controlled           Endo/Other        Arthritis     Other Findings              Physical Exam    Airway  Mallampati: III  TM Distance: < 4 cm  Neck ROM: normal range of motion   Mouth opening: Normal     Cardiovascular    Rhythm: regular  Rate: normal         Dental    Dentition: Poor dentition     Pulmonary  Breath sounds clear to auscultation               Abdominal  Abdominal exam normal       Other Findings            Anesthetic Plan    ASA: 2  Anesthesia type: general - backup, regional and spinal - saphenous block          Induction: Intravenous  Anesthetic plan and risks discussed with: Patient

## 2020-10-19 NOTE — INTERVAL H&P NOTE
Update History & Physical 
 
The Patient's History and Physical was reviewed with the patient. There was no change. The surgical site was confirmed by the patient and me. Plan:  The risk, benefits, expected outcome, and alternative to the recommended procedure have been discussed with the patient. Patient understands and wants to proceed with the procedure.  
 
Electronically signed by Lisseth Tijerina MD on 10/19/2020 at 8:11 AM

## 2020-10-19 NOTE — OP NOTES
Operative Note    Patient: Bertha Steve MRN: [id number]  Surgery Date: 10/19/2020  [unfilled]          Procedure  Primary Surgeon    RIGHT TKA (HIGH BMI) (OUTPATIENT)  Ismael Arriola MD    * Panel 2 does not exist *  * Panel 2 does not exist *    * Panel 3 does not exist *  * Panel 3 does not exist *     Surgeon(s) and Role:     * Ismael Arriola MD - Primary    Other OR Staff/Assistants:  Circ-1: Renetta Morton  Scrub Tech-1: Omar Grijalva; Ressie Green Tree  Surg Asst-1: Vi Wheeler    1st Assistant Tasks:  Closing    Pre-operative Diagnosis:  Osteoarthritis of right knee, unspecified osteoarthritis type [M17.11]    Post-operative Diagnosis: same as preop diagnosis    Anesthesia Type: General     Findings: djd    Complications: No    EBL: 50 cc    Specimens: None    Implants     Cement    Smartset Ghv Cement - Implanted   (Right) Knee    Lot number: [id number]      As of 10/19/2020     Status: Implanted                  Implant    Seg Pros Porous 52mm Grp2 -- Integrip Rev - [id number] - Implanted   (Left) Hip    Inventory item: SHELL ACET SZ 52MM GRP 2 UNIV DELFINO TI CLUS H ISIDRA REV Model/Cat number: [id number]    Serial number: [id number] : [employer]    As of 4/6/2015     Status: Implanted                  Liner Gxl Ntrl Cup 32mm Grp2 -- - [id number] - Implanted   (Left) Hip    Inventory item: LINER ACET ID32MM GRP 2 NEUT DELFINO POLYETH HIP FOR NOVATION Model/Cat number: [id number]    Serial number: [id number] : [employer]    As of 4/6/2015     Status: Implanted                  Alteon Bone Screw 6.5mm Diameter X 35mm Length - Implanted   (Left) Hip    Model/Cat number: [id number] Serial number: [id number]    : [employer] Size: [id number]    As of 4/6/2015     Status: Implanted                  Alteon Bone Screw 6.5mm Diameter X 25mm Length - Implanted   (Left) Hip    Model/Cat number: [id number] Serial number: [id number]    : [employer]      As of 4/6/2015     Status: Implanted                  Head Fem Cer 32mm Od -3.5mm -- Biolox Delta - [id number] - Implanted   (Left) Hip    Inventory item: HEAD FEM OD32MM -3.5MM 12/14 TAPR HIP BIOLOX OPT Model/Cat number: [id number]    Serial number: [id number] : Dotty Alpers    As of 4/6/2015     Status: Implanted                  Stem Tapr So Ha 12/14 Sz 12 -- Novation - [id number] - Implanted   (Left) Hip    Inventory item: STEM FEM OD12MM 12/14 STD OFFSET CLLRD TAPR [address] [place]N Model/Cat number: [id number]    Serial number: [id number] : Dotty Alpers    As of 4/6/2015     Status: Implanted                  Hip Pres Fit Cer-On-Xlp/Hxlp -- H1 Bsv - Sconstruct. - Implanted   (Left) Hip    Inventory item: COMPONENT HIP PRSS FT CERM ON HXLPE Model/Cat number: [id number]    Serial number: [id number].  : Dotty Alpers    As of 4/6/2015     Status: Implanted                  Component Fem Sz 7 R Knee Post Stbl Wilfred Attune - [id number] - Implanted   (Right) Knee    Inventory item: COMPONENT FEM SZ 7 R KNEE POST STBL WILFRED ATTUNE Model/Cat number: [id number]    : [employer] Lot number: [id number]    As of 10/19/2020     Status: Implanted                  Component Pat Bdz24vm Knee Poly Dome Wilfred Medialized Attune - [id number] - Implanted   (Right) Knee    Inventory item: COMPONENT PAT [id number] KNEE POLY DOME WILFRED MEDIALIZED ATTUNE Model/Cat number: [id number]    : [employer] Lot number: [id number]    As of 10/19/2020     Status: Implanted                  Insert Tib Sz 7 Thk5mm Knee Post Stbl Rot Platfrm Attune - [id number] - Implanted   (Right) Knee    Inventory item: INSERT TIB SZ 7 THK5MM KNEE POST STBL ROT PLATFRM ATTUNE Model/Cat number: [id number]    : [employer] Lot number: [id number]    As of 10/19/2020     Status: Implanted                  Baseplate Tib Sz 6 Rot Platfrm Co Chrom Molybdenum Ti Paris Rule - [id number] - Implanted   (Right) Knee    Inventory item: BASEPLATE TIB SZ 6 ROT PLATFRM CO CHROM MOLYBDENUM TI ALLY Model/Cat number: [id number]    : [employer]_ Lot number: [id number]    As of 10/19/2020     Status: Implanted                         OPERATIVE PROCEDURE:  Please note the first assistant role was to help in patient positioning and draping of the extremity in a sterile fashion. Also during the surgery the assistant's responsibilities included but not limited to extremity positioning during critical portions of the surgery. Assisting in using and placement of retractors during surgery. Lower extremity was prepped and draped in a sterile fashion. After adequate anesthesia was given, the patient was placed in a well-padded supine position. Subvastus arthrotomy from the tibial tubercle to the superior pole of the patella was made. Knee was hyperflexed. Intramedullary reaming of distal femur and proximal tibia was performed. 10 mm of distal femur was cut. Anterior-posterior sizing guide was used. Anterior, posterior, chamfer cuts, and box cuts were made next. Proximal tibial cut and preparation performed. Posterior osteophyte meniscal remnants were removed, and also patella was everted. Free-hand cut of the patella was made. Trial components were placed. The patient was found to have excellent range of motion and stability with all trial components. All the trial components removed. Copious irrigation performed. Distal femur, proximal tibia, and patella were impacted in place. Excessive cement was removed. After the cement was hard, Subvastus arthrotomy closed with Vicryl and Prineo stitch. Compressive dressing was applied. The patient was taken to PACU in stable condition. Please note due to the patient's BMI of greater than 35 significant surgical effort was required compared to the standard patient with a BMI lower than 35.   Surgical time increased approximately 20% from the normal surgical time due to the patient's high BMI.       Ladi Bean MD

## 2020-10-19 NOTE — ANESTHESIA PROCEDURE NOTES
Peripheral Block    Start time: 10/19/2020 9:46 AM  End time: 10/19/2020 9:56 AM  Performed by: Izabella Cline CRNA  Authorized by: Izabella Cline CRNA       Pre-procedure: Indications: at surgeon's request and post-op pain management    Preanesthetic Checklist: patient identified, risks and benefits discussed, site marked, timeout performed, anesthesia consent given and patient being monitored    Timeout Time: 09:45          Block Type:   Block Type:   Adductor canal  Laterality:  Right  Monitoring:  Standard ASA monitoring, responsive to questions, oxygen, continuous pulse ox, frequent vital sign checks and heart rate  Injection Technique:  Single shot  Procedures: ultrasound guided    Patient Position: supine  Prep: betadine and povidone-iodine 7.5% surgical scrub    Location:  Mid thigh  Needle Type:  Ultraplex  Needle Gauge:  20 G  Needle Localization:  Anatomical landmarks and ultrasound guidance  Medication Injected:  Dexamethasone (DECADRON) 4 mg/mL injection, 4 mg  bupivacaine 0.25%, 30 mL  Med Admin Time: 10/19/2020 9:50 AM    Assessment:  Number of attempts:  1  Injection Assessment:  Incremental injection every 5 mL, no paresthesia, negative aspiration for blood, no intravascular symptoms and local visualized surrounding nerve on ultrasound  Patient tolerance:  Patient tolerated the procedure well with no immediate complications

## 2020-10-19 NOTE — PROGRESS NOTES
Problem: Mobility Impaired (Adult and Pediatric)  Goal: *Acute Goals and Plan of Care (Insert Text)  Description: Physical Therapy Goals  Initiated 10/19/2020   1. Patient will move from supine to sit and sit to supine , scoot up and down, and roll side to side in bed with minimal assistance/contact guard assist.    2.  Patient will transfer from bed to chair and chair to bed with minimal assistance/contact guard assist using the least restrictive device. 3.  Patient will perform sit to stand with minimal assistance/contact guard assist.  4.  Patient will ambulate with minimal assistance/contact guard assist for 300 feet with the least restrictive device. 5.  Patient will ascend/descend 4 stairs with bilateral handrail(s) with minimal assistance/contact guard assist.    PLOF: Pt was an independent community ambulator with use of a straight cane for longer distances. Outcome: Resolved/Met   PHYSICAL THERAPY EVALUATION AND DISCHARGE    Patient: Na Walter (40 y.o. female)  Date: 10/19/2020  Primary Diagnosis: Osteoarthritis of right knee, unspecified osteoarthritis type [M17.11]  Knee osteoarthritis [M17.10]  Procedure(s) (LRB):  RIGHT TKA (HIGH BMI) (OUTPATIENT) (Right) Day of Surgery   Precautions: WBAT RLE      ASSESSMENT :  Pt was able to perform sit to stand with contact guard. She reported feelings of dizziness and nausea, but these were resolved shortly after standing. Her blood pressure did not change upon a change in position from sitting to standing. She ambulated 300 feet with a rolling walker and contact guard and required 1 break due to \"tiredness\". She ascended and descended 4 steps with a step to pattern and contact guard. She expresses how she is independent with all ADL's at home and has family members near by hat can help if need be. Pt will benefit from skilled PT in the outpatient setting to address R knee ROM deficits, RLE strength, and gait training.          PLAN :  Recommendations and Planned Interventions:   No formal PT needs identified at this time. Discharge Recommendations: Outpatient  Further Equipment Recommendations for Discharge: None     SUBJECTIVE:   Patient stated my knee is alcides sore, but I just feel so tired.     OBJECTIVE DATA SUMMARY:     Past Medical History:   Diagnosis Date    Acid reflux     Arthritis     GERD (gastroesophageal reflux disease)     High cholesterol     Hypertension     Osteoarthritis of left hip 4/4/2015    Psychiatric disorder     depression     Past Surgical History:   Procedure Laterality Date    FOOT/TOES SURGERY PROC UNLISTED      HX HIP REPLACEMENT      HX ORTHOPAEDIC Right     great toe surgery x 2    HX TONSILLECTOMY       Barriers to Learning/Limitations: None  Compensate with: N/A  Home Situation:   Home Situation  Home Environment: Private residence  # Steps to Enter: 5  One/Two Story Residence: Two story  # of Interior Steps: 15  Interior Rails: Right  Lift Chair Available: No  Living Alone: No  Support Systems: Family member(s)  Patient Expects to be Discharged to[de-identified] Private residence  Current DME Used/Available at Home: None  Critical Behavior:  Neurologic State: Alert; Appropriate for age  Orientation Level: Appropriate for age;Oriented X4  Cognition: Follows commands; Appropriate decision making   Strength:    Strength: Generally decreased, functional   RUE: 5/5  LUE: 5/5  RLE: 4-/5  LLE: 5/5  Pt performed SLR at 1550, which was 5 hours and 15 minutes after her spinal block. Tone & Sensation:    Sensation: Intact   Range Of Motion:   AROM: Generally decreased, functional   RUE: WFL  LUE: WFL  RLE: Pt lacked 15 degrees of R knee extension. She currently has 60 degrees of R knee flexion AROM and 75 degrees of R knee flexion PROM.   LLE: WFL  Posture:  Posture (WDL): Within defined limits  Bed Mobility:  Rolling: Modified independent  Supine to Sit: Modified independent  Sit to Supine: Modified independent  Scooting: Modified independent  Transfers:  Sit to Stand: Contact guard assistance  Stand to Sit: Contact guard assistance  Balance:   Sitting: Intact  Standing: Intact  Ambulation/Gait Training:  Distance (ft): 300 Feet (ft)  (2x150')  Assistive Device: Walker, rolling;Gait belt  Ambulation - Level of Assistance: Contact guard assistance  Gait Abnormalities: Other(Forward trunk lean)  Right Side Weight Bearing: As tolerated   Stairs:  Number of Stairs Trained: 4  Stairs - Level of Assistance: Contact guard assistance  Rail Use: Both     Today's TX:   Pt was able to ambulate and perform stair training well with some fatigue throughout. Pain:  Pain level pre-treatment: 4/10   Pain level post-treatment: 3/10  Pain Location: R knee  Pain Intervention(s): Medication (see MAR); Rest, Ice, Repositioning    Activity Tolerance:   Pt tolerated weight bearing with minimal complaints. Vitals:   BP: 99/72 in sitting; 98/67 in standing    Please refer to the flowsheet for vital signs taken during this treatment. After treatment:   []         Patient left in no apparent distress sitting up in chair  [x]         Patient left in no apparent distress in bed  [x]         Call bell left within reach  []         Nursing notified  []         Caregiver present  []         Bed alarm activated  []         SCDs applied    COMMUNICATION/EDUCATION:   []         Role of Physical Therapy in the acute care setting. []         Fall prevention education was provided and the patient/caregiver indicated understanding. []         Patient/family have participated as able in goal setting and plan of care. [x]         Patient/family agree to work toward stated goals and plan of care. []         Patient understands intent and goals of therapy, but is neutral about his/her participation. []         Patient is unable to participate in goal setting/plan of care: ongoing with therapy staff.  []         Other:     Thank you for this referral.  Delbert Goodman, SPT  Deisy Hart, PT, DPT   Time Calculation: 46 mins

## 2020-10-27 ENCOUNTER — VIRTUAL VISIT (OUTPATIENT)
Dept: ORTHOPEDIC SURGERY | Age: 70
End: 2020-10-27
Payer: MEDICARE

## 2020-10-27 DIAGNOSIS — M17.11 OSTEOARTHRITIS OF RIGHT KNEE, UNSPECIFIED OSTEOARTHRITIS TYPE: Primary | ICD-10-CM

## 2020-10-27 PROCEDURE — 99024 POSTOP FOLLOW-UP VISIT: CPT | Performed by: ORTHOPAEDIC SURGERY

## 2020-10-27 NOTE — PROGRESS NOTES
Name: Bertha Steve    : 1950  Service Dept: [address] [place] [address] MEDICINE         Chief Complaint   Patient presents with    Surgical Follow-up        Patient's Pharmacies:    [facility]/pharmacy [address] [place] [address]  Phone: [phone] Fax: [phone]       There were no vitals taken for this visit. Allergies   Allergen Reactions    Latex Other (comments)     Redness of skin and blisters    Latex, Natural Rubber Unknown (comments)    Other Food Rash     Raw vegetables, rash if touched    Adhesive Tape-Silicones Rash    Aloe Vera Rash    Chlorhexidine Other (comments)     Redness and Burning sensation    Codeine Nausea and Vomiting    Codeine Unknown (comments)    Other Plant, Animal, Environmental Other (comments)     Detergents make skin blister        Current Outpatient Medications   Medication Sig Dispense Refill    aspirin (ASPIRIN) 325 mg tablet Take 325 mg by mouth two (2) times a day.  amitriptyline (ELAVIL) 10 mg tablet Take  by mouth nightly.  amLODIPine (NORVASC) 5 mg tablet Take 5 mg by mouth daily.  FLUoxetine (PROzac) 20 mg capsule Take  by mouth daily.  ipratropium (ATROVENT) 42 mcg (0.06 %) nasal spray 2 Sprays four (4) times daily.  LORazepam (ATIVAN) 0.5 mg tablet Take  by mouth.  meloxicam (MOBIC) 15 mg tablet Take 15 mg by mouth daily.  montelukast (SINGULAIR) 10 mg tablet Take 10 mg by mouth daily.  pantoprazole (PROTONIX) 40 mg tablet Take 40 mg by mouth daily.  albuterol (Ventolin HFA) 90 mcg/actuation inhaler Take  by inhalation.  oxyCODONE-acetaminophen (PERCOCET) 5-325 mg per tablet Take 1-2 Tabs by mouth every four (4) hours as needed for Pain. Max Daily Amount: 12 Tabs. 60 Tab 0    cholecalciferol, VITAMIN D3, (VITAMIN D3) 5,000 unit tab tablet Take 5,000 Units by mouth daily.       cetirizine (ZYRTEC) 10 mg tablet Take 10 mg by mouth nightly.  simvastatin (ZOCOR) 20 mg tablet Take 20 mg by mouth nightly. Patient Active Problem List   Diagnosis Code    Osteoarthritis of left hip M16.12    Knee osteoarthritis M17.10        No family history on file. Social History     Socioeconomic History    Marital status: UNKNOWN     Spouse name: Not on file    Number of children: Not on file    Years of education: Not on file    Highest education level: Not on file   Tobacco Use    Smoking status: Former Smoker     Packs/day: 1.00     Years: 20.00     Pack years: 20.00     Last attempt to quit: 1991     Years since quittin.8    Smokeless tobacco: Never Used   Substance and Sexual Activity    Alcohol use: Yes     Comment: daily    Drug use: No   Social History Narrative    ** Merged History Encounter **             Past Surgical History:   Procedure Laterality Date    FOOT/TOES SURGERY PROC UNLISTED      HX HIP REPLACEMENT      HX ORTHOPAEDIC Right     great toe surgery x 2    HX TONSILLECTOMY          Past Medical History:   Diagnosis Date    Acid reflux     Arthritis     GERD (gastroesophageal reflux disease)     High cholesterol     Hypertension     Osteoarthritis of left hip 2015    Psychiatric disorder     depression        HPI:   I have reviewed and agree with [address] [place] [address] Nw and ROS and intake form in chart and the record. Review of Systems:   Patient is a pleasant appearing individual, appropriately dressed, well hydrated, well nourished, who is alert, appropriately oriented for age, and in no acute distress with a normal gait and normal affect who does not appear to be in any significant pain. VIRTUAL APPOINTMENT    HPI:  The patient is here status post right total knee replacement. Surgery was on 10/19/2020. Doing well. Just a bit of soreness. Physical examination is not available as it was a phone consult, virtual appointment, but she is doing well.   She is barely using any assistive devices. Assessment/Plan: We will see her back as needed. If she gets worse, she is to give me a call and go from there. Scribed by Ava Shipman LPN as dictated by [facility] [place] NIRANJAN Villa MD.      Return to Office: Follow-up Information    None             Documentation True and Accepted Tim Villa MD      Merle Dominguez, who was evaluated through a synchronous (real-time) audio only encounter, and/or her healthcare decision maker, is aware that it is a billable service, with coverage as determined by her insurance carrier. She provided verbal consent to proceed: Yes, and patient identification was verified. It was conducted pursuant to the emergency declaration under the [address] [place] [address] authority and the [employer] General Act. A caregiver was present when appropriate. Ability to conduct physical exam was limited. I was in the office. The patient was at home.   5-10 min phone call

## 2020-10-27 NOTE — PATIENT INSTRUCTIONS
Knee Pain or Injury: Care Instructions Your Care Instructions Injuries are a common cause of knee problems. Sudden (acute) injuries may be caused by a direct blow to the knee. They can also be caused by abnormal twisting, bending, or falling on the knee. Pain, bruising, or swelling may be severe, and may start within minutes of the injury. Overuse is another cause of knee pain. Other causes are climbing stairs, kneeling, and other activities that use the knee. Everyday wear and tear, especially as you get older, also can cause knee pain. Rest, along with home treatment, often relieves pain and allows your knee to heal. If you have a serious knee injury, you may need tests and treatment. Follow-up care is a key part of your treatment and safety. Be sure to make and go to all appointments, and call your doctor if you are having problems. It's also a good idea to know your test results and keep a list of the medicines you take. How can you care for yourself at home? · Be safe with medicines. Read and follow all instructions on the label. ? If the doctor gave you a prescription medicine for pain, take it as prescribed. ? If you are not taking a prescription pain medicine, ask your doctor if you can take an over-the-counter medicine. · Rest and protect your knee. Take a break from any activity that may cause pain. · Put ice or a cold pack on your knee for 10 to 20 minutes at a time. Put a thin cloth between the ice and your skin. · Prop up a sore knee on a pillow when you ice it or anytime you sit or lie down for the next 3 days. Try to keep it above the level of your heart. This will help reduce swelling. · If your knee is not swollen, you can put moist heat, a heating pad, or a warm cloth on your knee. · If your doctor recommends an elastic bandage, sleeve, or other type of support for your knee, wear it as directed.  
· Follow your doctor's instructions about how much weight you can put on your leg. Use a cane, crutches, or a walker as instructed. · Follow your doctor's instructions about activity during your healing process. If you can do mild exercise, slowly increase your activity. · Reach and stay at a healthy weight. Extra weight can strain the joints, especially the knees and hips, and make the pain worse. Losing even a few pounds may help. When should you call for help? Call 911 anytime you think you may need emergency care. For example, call if: 
  · You have symptoms of a blood clot in your lung (called a pulmonary embolism). These may include: 
? Sudden chest pain. ? Trouble breathing. ? Coughing up blood. Call your doctor now or seek immediate medical care if: 
  · You have severe or increasing pain.  
  · Your leg or foot turns cold or changes color.  
  · You cannot stand or put weight on your knee.  
  · Your knee looks twisted or bent out of shape.  
  · You cannot move your knee.  
  · You have signs of infection, such as: 
? Increased pain, swelling, warmth, or redness. ? Red streaks leading from the knee. ? Pus draining from a place on your knee. ? A fever.  
  · You have signs of a blood clot in your leg (called a deep vein thrombosis), such as: 
? Pain in your calf, back of the knee, thigh, or groin. ? Redness and swelling in your leg or groin. Watch closely for changes in your health, and be sure to contact your doctor if: 
  · You have tingling, weakness, or numbness in your knee.  
  · You have any new symptoms, such as swelling.  
  · You have bruises from a knee injury that last longer than 2 weeks.  
  · You do not get better as expected. Where can you learn more? Go to http://www.gray.com/ Enter K195 in the search box to learn more about \"Knee Pain or Injury: Care Instructions. \" Current as of: June 26, 2019               Content Version: 12.6 © 3134-9157 "[employer]. Care instructions adapted under license by [employer] (which disclaims liability or warranty for this information). If you have questions about a medical condition or this instruction, always ask your healthcare professional. [address] any warranty or liability for your use of this information.

## 2020-11-30 ENCOUNTER — TELEPHONE (OUTPATIENT)
Dept: ORTHOPEDIC SURGERY | Age: 70
End: 2020-11-30

## 2020-11-30 NOTE — LETTER
NOTIFICATION RETURN TO WORK / SCHOOL 
 
12/1/2020 4:11 PM 
 
Ms. Beryle Band [address] [place] [id number] To Whom It May Concern: 
 
Beryle Band is currently under the care of [address] [place] [address]. She will return to work/school on: 12/07/2020 If there are questions or concerns please have the patient contact our office.  
 
 
 
Sincerely, 
 
 
Lisseth Tijerina MD

## 2021-01-08 ENCOUNTER — OFFICE VISIT (OUTPATIENT)
Dept: ORTHOPEDIC SURGERY | Age: 71
End: 2021-01-08
Payer: MEDICARE

## 2021-01-08 DIAGNOSIS — M70.61 TROCHANTERIC BURSITIS OF RIGHT HIP: ICD-10-CM

## 2021-01-08 DIAGNOSIS — M25.551 RIGHT HIP PAIN: ICD-10-CM

## 2021-01-08 DIAGNOSIS — M17.11 PRIMARY OSTEOARTHRITIS OF RIGHT KNEE: Primary | ICD-10-CM

## 2021-01-08 PROCEDURE — 3017F COLORECTAL CA SCREEN DOC REV: CPT | Performed by: ORTHOPAEDIC SURGERY

## 2021-01-08 PROCEDURE — 1101F PT FALLS ASSESS-DOCD LE1/YR: CPT | Performed by: ORTHOPAEDIC SURGERY

## 2021-01-08 PROCEDURE — G8427 DOCREV CUR MEDS BY ELIG CLIN: HCPCS | Performed by: ORTHOPAEDIC SURGERY

## 2021-01-08 PROCEDURE — G8400 PT W/DXA NO RESULTS DOC: HCPCS | Performed by: ORTHOPAEDIC SURGERY

## 2021-01-08 PROCEDURE — 20611 DRAIN/INJ JOINT/BURSA W/US: CPT | Performed by: ORTHOPAEDIC SURGERY

## 2021-01-08 PROCEDURE — 99214 OFFICE O/P EST MOD 30 MIN: CPT | Performed by: ORTHOPAEDIC SURGERY

## 2021-01-08 PROCEDURE — G8417 CALC BMI ABV UP PARAM F/U: HCPCS | Performed by: ORTHOPAEDIC SURGERY

## 2021-01-08 PROCEDURE — G8536 NO DOC ELDER MAL SCRN: HCPCS | Performed by: ORTHOPAEDIC SURGERY

## 2021-01-08 PROCEDURE — 1090F PRES/ABSN URINE INCON ASSESS: CPT | Performed by: ORTHOPAEDIC SURGERY

## 2021-01-08 PROCEDURE — G8510 SCR DEP NEG, NO PLAN REQD: HCPCS | Performed by: ORTHOPAEDIC SURGERY

## 2021-01-08 RX ORDER — LIDOCAINE HYDROCHLORIDE 10 MG/ML
9 INJECTION INFILTRATION; PERINEURAL ONCE
Status: COMPLETED | OUTPATIENT
Start: 2021-01-08 | End: 2021-01-08

## 2021-01-08 RX ORDER — TRIAMCINOLONE ACETONIDE 40 MG/ML
40 INJECTION, SUSPENSION INTRA-ARTICULAR; INTRAMUSCULAR ONCE
Status: COMPLETED | OUTPATIENT
Start: 2021-01-08 | End: 2021-01-08

## 2021-01-08 RX ADMIN — LIDOCAINE HYDROCHLORIDE 9 ML: 10 INJECTION INFILTRATION; PERINEURAL at 15:35

## 2021-01-08 RX ADMIN — TRIAMCINOLONE ACETONIDE 40 MG: 40 INJECTION, SUSPENSION INTRA-ARTICULAR; INTRAMUSCULAR at 15:35

## 2021-01-08 NOTE — PROGRESS NOTES
Name: Chante Blunt    :      Service Dept: [address] [place] [address] Orthopaedics and Sports Medicine    Patient's Pharmacies:    [id number] [address] Jodie Lam  [address] [place] [address]  Phone: [phone] Fax: [phone]       Chief Complaint   Patient presents with    Knee Pain        There were no vitals taken for this visit. Allergies   Allergen Reactions    Latex Other (comments)     Redness of skin and blisters    Latex, Natural Rubber Unknown (comments)    Other Food Rash     Raw vegetables, rash if touched    Adhesive Tape-Silicones Rash    Aloe Vera Rash    Chlorhexidine Other (comments)     Redness and Burning sensation    Codeine Nausea and Vomiting    Codeine Unknown (comments)    Other Plant, Animal, Environmental Other (comments)     Detergents make skin blister      Current Outpatient Medications   Medication Sig Dispense Refill    aspirin (ASPIRIN) 325 mg tablet Take 325 mg by mouth two (2) times a day.  amitriptyline (ELAVIL) 10 mg tablet Take  by mouth nightly.  amLODIPine (NORVASC) 5 mg tablet Take 5 mg by mouth daily.  FLUoxetine (PROzac) 20 mg capsule Take  by mouth daily.  ipratropium (ATROVENT) 42 mcg (0.06 %) nasal spray 2 Sprays four (4) times daily.  LORazepam (ATIVAN) 0.5 mg tablet Take  by mouth.  meloxicam (MOBIC) 15 mg tablet Take 15 mg by mouth daily.  montelukast (SINGULAIR) 10 mg tablet Take 10 mg by mouth daily.  pantoprazole (PROTONIX) 40 mg tablet Take 40 mg by mouth daily.  albuterol (Ventolin HFA) 90 mcg/actuation inhaler Take  by inhalation.  oxyCODONE-acetaminophen (PERCOCET) 5-325 mg per tablet Take 1-2 Tabs by mouth every four (4) hours as needed for Pain. Max Daily Amount: 12 Tabs. 60 Tab 0    cholecalciferol, VITAMIN D3, (VITAMIN D3) 5,000 unit tab tablet Take 5,000 Units by mouth daily.  cetirizine (ZYRTEC) 10 mg tablet Take 10 mg by mouth nightly.  simvastatin (ZOCOR) 20 mg tablet Take 20 mg by mouth nightly. Current Facility-Administered Medications   Medication Dose Route Frequency Provider Last Rate Last Admin    lidocaine (XYLOCAINE) 10 mg/mL (1 %) injection 9 mL  9 mL Other ONCE Tim Marie MD        triamcinolone acetonide (KENALOG-40) 40 mg/mL injection 40 mg  40 mg Intra artICUlar ONCE Abimael Villa MD          Patient Active Problem List   Diagnosis Code    Osteoarthritis of left hip M16.12    Knee osteoarthritis M17.10      History reviewed. No pertinent family history. Social History     Socioeconomic History    Marital status: UNKNOWN     Spouse name: Not on file    Number of children: Not on file    Years of education: Not on file    Highest education level: Not on file   Tobacco Use    Smoking status: Former Smoker     Packs/day: 1.00     Years: 20.00     Pack years: 20.00     Quit date: 1991     Years since quittin.0    Smokeless tobacco: Never Used   Substance and Sexual Activity    Alcohol use: Yes     Comment: daily    Drug use: No   Social History Narrative    ** Merged History Encounter **           Past Surgical History:   Procedure Laterality Date    FOOT/TOES SURGERY PROC UNLISTED      HX HIP REPLACEMENT      HX ORTHOPAEDIC Right     great toe surgery x 2    HX TONSILLECTOMY        Past Medical History:   Diagnosis Date    Acid reflux     Arthritis     GERD (gastroesophageal reflux disease)     High cholesterol     Hypertension     Osteoarthritis of left hip 2015    Psychiatric disorder     depression        I have reviewed and agree with [address] [place] [address] Street Nw and ROS and intake form in chart and the record. Review of Systems:   Patient is a pleasant appearing individual, appropriately dressed, well hydrated, well nourished, who is alert, appropriately oriented for age, and in no acute distress with a normal gait and normal affect who does not appear to be in any significant pain.      Physical Exam:  Right Hip - Slight decrease range of motion, No crepitation, Grossly neurovascularly intact, Good cap refill, No skin lesion, mild swelling, No gross instability, Some weakness, No groin pain, Point tenderness on the greater trochanter. Left Hip - Normal range of motion, No crepitation, Grossly neurovascularly intact, Good cap refill, No skin lesion, mild swelling, No gross instability, No weakness, No groin pain, No point tenderness on the greater trochanter. Procedure Documentation:    Please note that [employer] ultrasound was used to perform an ultrasound guided injection into the right hip. A pre-injection ultrasound was taken of right hip. After the needle was placed into the lateral aspect of the hip(s) and while the kenelog was injected another ultrasound picture confirmed the appropriate placement. The site of injection, right hip, was sterilely prepped. The injection of 40 mg Kenalog and Lidocaine was administered appropriately in right hip and the patient tolerated it well. No site reaction was identified. Appropriate dressing was placed. Consent was obtained for the injection. Encounter Diagnoses     ICD-10-CM ICD-9-CM   1. Primary osteoarthritis of right knee  M17.11 715.16   2. Right hip pain  M25.551 719.45   3. Trochanteric bursitis of right hip  M70.61 726.5       HPI:  The patient is here status post right knee replacement, doing well. The right hip is bothering her today. Denies any complaints. No history of fall. Assessment/Plan:  1. Right hip bursitis. Plan would be for a cortisone injection. We will see her back as needed, yearly appointment for her right knee, and go from there. Return to Office: Follow-up and Dispositions    · Return for PRN. Scribed by Liisa Snellen as dictated by Marion Joshua. Liss Ayala MD.  Documentation True and Accepted Tim Ayala MD

## 2021-01-08 NOTE — PATIENT INSTRUCTIONS
Hip Pain: Care Instructions Your Care Instructions Hip pain may be caused by many things, including overuse, a fall, or a twisting movement. Another cause of hip pain is arthritis. Your pain may increase when you stand up, walk, or squat. The pain may come and go or may be constant. Home treatment can help relieve hip pain, swelling, and stiffness. If your pain is ongoing, you may need more tests and treatment. Follow-up care is a key part of your treatment and safety. Be sure to make and go to all appointments, and call your doctor if you are having problems. It's also a good idea to know your test results and keep a list of the medicines you take. How can you care for yourself at home? · Take pain medicines exactly as directed. ? If the doctor gave you a prescription medicine for pain, take it as prescribed. ? If you are not taking a prescription pain medicine, ask your doctor if you can take an over-the-counter medicine. · Rest and protect your hip. Take a break from any activity, including standing or walking, that may cause pain. · Put ice or a cold pack against your hip for 10 to 20 minutes at a time. Try to do this every 1 to 2 hours for the next 3 days (when you are awake) or until the swelling goes down. Put a thin cloth between the ice and your skin. · Sleep on your healthy side with a pillow between your knees, or sleep on your back with pillows under your knees. · If there is no swelling, you can put moist heat, a heating pad, or a warm cloth on your hip. Do gentle stretching exercises to help keep your hip flexible. · Learn how to prevent falls. Have your vision and hearing checked regularly. Wear slippers or shoes with a nonskid sole. · Stay at a healthy weight. · Wear comfortable shoes. When should you call for help? Call 911 anytime you think you may need emergency care. For example, call if: 
  · You have sudden chest pain and shortness of breath, or you cough up blood.   · You are not able to stand or walk or bear weight.  
  · Your buttocks, legs, or feet feel numb or tingly.  
  · Your leg or foot is cool or pale or changes color.  
  · You have severe pain. Call your doctor now or seek immediate medical care if: 
  · You have signs of infection, such as: 
? Increased pain, swelling, warmth, or redness in the hip area. ? Red streaks leading from the hip area. ? Pus draining from the hip area. ? A fever.  
  · You have signs of a blood clot, such as: 
? Pain in your calf, back of the knee, thigh, or groin. ? Redness and swelling in your leg or groin.  
  · You are not able to bend, straighten, or move your leg normally.  
  · You have trouble urinating or having bowel movements. Watch closely for changes in your health, and be sure to contact your doctor if: 
  · You do not get better as expected. Where can you learn more? Go to http://www.gray.com/ Enter S651 in the search box to learn more about \"Hip Pain: Care Instructions. \" Current as of: June 26, 2019               Content Version: 12.6 © 2634-7815 [employer]. Care instructions adapted under license by [employer] (which disclaims liability or warranty for this information). If you have questions about a medical condition or this instruction, always ask your healthcare professional. [place] [address] any warranty or liability for your use of this information.

## 2021-01-08 NOTE — LETTER
Ed Hewitt 1950  
[id number]  
 
 
1/8/2021 I hereby authorize and direct Tim Rios MD, Tera Yeh, and whomever he may designate as his associate to perform upon myself the following procedure: 
 
Injection of: Kenalog, Supartz, Euflexxa, Orthovisc in the Right/Left ____________________. If any unforeseen condition arises in the course of the procedure, I further authorize him and his associated and/or assistant(s) to do whatever he/she deems advisable. The nature, purpose, benefits, risks, side effects, likelihood of achieving goals, and potential problems that might occur during recuperation, risks for not receiving the proposed care, treatment and services and alternatives of the procedure have been fully explained to me by my physician including, but not limited to: 
 
Swelling, joint pain, skin pigment changes, worsening of condition, and failure to improve. I acknowledge that no guarantee or assurance has been made to me as to the results that may be obtained or the likelihood of success. _______________________________________ Signature of patient or authorized representative [employer] [facility] fax: [phone]

## (undated) DEVICE — SUTURE VCRL SZ 0 L27IN ABSRB UD L36MM CT-1 1/2 CIR J260H

## (undated) DEVICE — SKIN CLOS DERMABND PRINEO 60CM -- DERMABOUND PRINEO

## (undated) DEVICE — SUTURE VCRL SZ 2-0 L27IN ABSRB UD L26MM CT-2 1/2 CIR J269H

## (undated) DEVICE — RECIPROCATING BLADE HEAVY DUTY LONG, OFFSET  (77.6 X 0.77 X 11.2MM)

## (undated) DEVICE — STERILE POLYISOPRENE POWDER-FREE SURGICAL GLOVES WITH EMOLLIENT COATING: Brand: PROTEXIS

## (undated) DEVICE — DRAPE,TOP,102X53,STERILE: Brand: MEDLINE

## (undated) DEVICE — STERILE POLYISOPRENE POWDER-FREE SURGICAL GLOVES: Brand: PROTEXIS

## (undated) DEVICE — STRYKER PERFORMANCE SERIES SAGITTAL BLADE: Brand: STRYKER PERFORMANCE SERIES

## (undated) DEVICE — BLADE ELECTRODE: Brand: VALLEYLAB

## (undated) DEVICE — HYPODERMIC SAFETY NEEDLE: Brand: MAGELLAN

## (undated) DEVICE — POWDER HEMOSTAT GEL 3.0GR -- SURGICEL

## (undated) DEVICE — SPONGE LAP SOFT 18X18 IN X RAY DETECTABLE

## (undated) DEVICE — GLOVE SURG 7 BIOGEL PI ULTRATOUCH G

## (undated) DEVICE — CEMENT BNE 40GM FULL DOSE PMMA W/ GENT HI VISC RADPQ LNG

## (undated) DEVICE — HOOD, PEEL-AWAY: Brand: FLYTE

## (undated) DEVICE — 3M™ IOBAN™ 2 ANTIMICROBIAL INCISE DRAPE 6650EZ: Brand: IOBAN™ 2

## (undated) DEVICE — (D)HANDPIECE IRR W/HI FLO TIP -- DUPLICATE USE ITEM 121586

## (undated) DEVICE — GOWN,AURORA,FABRIC-REINFORCED,X-LARGE: Brand: MEDLINE

## (undated) DEVICE — INTENDED FOR TISSUE SEPARATION, AND OTHER PROCEDURES THAT REQUIRE A SHARP SURGICAL BLADE TO PUNCTURE OR CUT.: Brand: BARD-PARKER SAFETY BLADES SIZE 10, STERILE

## (undated) DEVICE — SLEEVE COMPR UNIV WOMEN REG FT GARMENTS

## (undated) DEVICE — DRESSING SURG 4X14 IN POSTOP SIL BORD MEPILEX

## (undated) DEVICE — ATTUNE SOLO PINNING SYSTEM

## (undated) DEVICE — ZIMMER® STERILE DISPOSABLE TOURNIQUET CUFF WITH PLC, DUAL PORT, SINGLE BLADDER, 34 IN. (86 CM)

## (undated) DEVICE — 3M™ COBAN™ NL STERILE NON-LATEX SELF-ADHERENT WRAP, 2084S, 4 IN X 5 YD (10 CM X 4,5 M), 18 ROLLS/CASE: Brand: 3M™ COBAN™

## (undated) DEVICE — MANIFOLD CART ULT HI FLOW W 3 PRT FOR SUCT

## (undated) DEVICE — SUTURE STRATAFIX SPRL MCRYL + 3 0 SGL ARMED PS 1 24 IN LEN SXMP1B103

## (undated) DEVICE — SUTURE MCRYL SZ 3-0 L27IN ABSRB UD L24MM PS-1 3/8 CIR PRIM Y936H

## (undated) DEVICE — (D)PREP SKN CHLRAPRP APPL 26ML -- CONVERT TO ITEM 371833

## (undated) DEVICE — TOTAL KNEE PACK: Brand: MEDLINE INDUSTRIES, INC.

## (undated) DEVICE — SHEET,DRAPE,70X100,STERILE: Brand: MEDLINE

## (undated) DEVICE — BNDG,ELSTC,MATRIX,STRL,6"X5YD,LF,HOOK&LP: Brand: MEDLINE

## (undated) DEVICE — SUTURE VCRL SZ 1 L27IN ABSRB UD CT-1 L36MM 1/2 CIR J261H

## (undated) DEVICE — BOWL BNE CEM MIX SPAT CURET SMARTMIX CTS

## (undated) DEVICE — UNDERGLOVE SURG SZ 7.5 BLU LTX FREE SYN POLYISOPRENE